# Patient Record
Sex: FEMALE | Race: WHITE | Employment: OTHER | URBAN - METROPOLITAN AREA
[De-identification: names, ages, dates, MRNs, and addresses within clinical notes are randomized per-mention and may not be internally consistent; named-entity substitution may affect disease eponyms.]

---

## 2017-01-27 ENCOUNTER — OFFICE VISIT (OUTPATIENT)
Dept: INTERNAL MEDICINE CLINIC | Age: 77
End: 2017-01-27

## 2017-01-27 VITALS
WEIGHT: 140 LBS | RESPIRATION RATE: 16 BRPM | SYSTOLIC BLOOD PRESSURE: 114 MMHG | HEART RATE: 62 BPM | DIASTOLIC BLOOD PRESSURE: 73 MMHG | BODY MASS INDEX: 25.61 KG/M2

## 2017-01-27 DIAGNOSIS — E78.2 MIXED HYPERLIPIDEMIA: ICD-10-CM

## 2017-01-27 DIAGNOSIS — R53.83 FATIGUE, UNSPECIFIED TYPE: ICD-10-CM

## 2017-01-27 DIAGNOSIS — L20.84 INTRINSIC ECZEMA: Primary | ICD-10-CM

## 2017-01-27 DIAGNOSIS — M54.32 LEFT SIDED SCIATICA: ICD-10-CM

## 2017-01-27 PROCEDURE — 99213 OFFICE O/P EST LOW 20 MIN: CPT | Performed by: INTERNAL MEDICINE

## 2017-01-27 RX ORDER — BETAMETHASONE DIPROPIONATE 0.5 MG/G
CREAM TOPICAL
Qty: 1 TUBE | Refills: 3 | Status: SHIPPED | OUTPATIENT
Start: 2017-01-27 | End: 2017-02-26

## 2017-02-07 RX ORDER — GABAPENTIN 300 MG/1
CAPSULE ORAL
Qty: 30 CAPSULE | Refills: 10 | Status: SHIPPED | OUTPATIENT
Start: 2017-02-07 | End: 2017-10-26 | Stop reason: ALTCHOICE

## 2017-06-07 RX ORDER — LEVOTHYROXINE SODIUM 88 UG/1
TABLET ORAL
Qty: 90 TABLET | Refills: 0 | Status: SHIPPED | OUTPATIENT
Start: 2017-06-07 | End: 2017-09-05 | Stop reason: SDUPTHER

## 2017-06-21 RX ORDER — CITALOPRAM 20 MG/1
TABLET ORAL
Qty: 90 TABLET | Refills: 1 | Status: SHIPPED | OUTPATIENT
Start: 2017-06-21 | End: 2017-12-18 | Stop reason: SDUPTHER

## 2017-08-18 ENCOUNTER — TELEPHONE (OUTPATIENT)
Dept: INTERNAL MEDICINE CLINIC | Age: 77
End: 2017-08-18

## 2017-09-05 RX ORDER — LEVOTHYROXINE SODIUM 88 UG/1
TABLET ORAL
Qty: 90 TABLET | Refills: 0 | Status: SHIPPED | OUTPATIENT
Start: 2017-09-05 | End: 2017-12-04 | Stop reason: SDUPTHER

## 2017-09-25 ENCOUNTER — TELEPHONE (OUTPATIENT)
Dept: INTERNAL MEDICINE CLINIC | Age: 77
End: 2017-09-25

## 2017-09-25 DIAGNOSIS — Z82.49 FAMILY HISTORY OF BRAIN ANEURYSM: ICD-10-CM

## 2017-09-25 DIAGNOSIS — Z82.49 FAMILY HISTORY OF ANEURYSM OF BLOOD VESSEL OF BRAIN: Primary | ICD-10-CM

## 2017-09-25 DIAGNOSIS — R51.9 ACUTE NONINTRACTABLE HEADACHE, UNSPECIFIED HEADACHE TYPE: Primary | ICD-10-CM

## 2017-09-25 DIAGNOSIS — R51.9 ACUTE NONINTRACTABLE HEADACHE, UNSPECIFIED HEADACHE TYPE: ICD-10-CM

## 2017-09-26 ENCOUNTER — HOSPITAL ENCOUNTER (OUTPATIENT)
Dept: CT IMAGING | Age: 77
Discharge: HOME | End: 2017-09-26
Attending: INTERNAL MEDICINE | Admitting: INTERNAL MEDICINE

## 2017-09-26 DIAGNOSIS — R51.9 SEVERE FRONTAL HEADACHES: ICD-10-CM

## 2017-09-26 RX ORDER — SODIUM CHLORIDE 0.9 % (FLUSH) 0.9 %
10 SYRINGE (ML) INJECTION
Status: COMPLETED | OUTPATIENT
Start: 2017-09-26 | End: 2017-09-26

## 2017-09-26 RX ADMIN — Medication 10 ML: at 09:05

## 2017-10-26 ENCOUNTER — OFFICE VISIT (OUTPATIENT)
Dept: INTERNAL MEDICINE CLINIC | Age: 77
End: 2017-10-26

## 2017-10-26 VITALS
WEIGHT: 140 LBS | HEART RATE: 72 BPM | SYSTOLIC BLOOD PRESSURE: 128 MMHG | DIASTOLIC BLOOD PRESSURE: 70 MMHG | BODY MASS INDEX: 25.61 KG/M2 | RESPIRATION RATE: 16 BRPM

## 2017-10-26 DIAGNOSIS — Z12.39 BREAST CANCER SCREENING: ICD-10-CM

## 2017-10-26 DIAGNOSIS — R51.9 HEADACHE AROUND THE EYES: Primary | ICD-10-CM

## 2017-10-26 DIAGNOSIS — E03.9 ACQUIRED HYPOTHYROIDISM: ICD-10-CM

## 2017-10-26 DIAGNOSIS — E78.2 MIXED HYPERLIPIDEMIA: ICD-10-CM

## 2017-10-26 PROCEDURE — 99213 OFFICE O/P EST LOW 20 MIN: CPT | Performed by: INTERNAL MEDICINE

## 2017-10-26 NOTE — PROGRESS NOTES
Subjective:      Pranav Kapadia is a 69 y.o. female who presents today for follow up on her chronic medical conditions as noted below. Patient Active Problem List:     Colon cancer screening     Breast cancer screening     Depression with anxiety     Eczema     Chronic radicular lumbar pain     9/23 had severe HA after working franctically in hot sun on hot day w/o sunglasses and not enough water    To ER ; treated and released  Had CTA brain ; normal; no aneurysm    No HA since    Is anticipating she will need left sooner than right knee surgery    Is to see Shaye Cooper    Mood excellent with celexa    Back pain not a current problems    Current Outpatient Prescriptions   Medication Sig Dispense Refill    levothyroxine (SYNTHROID) 88 MCG tablet TAKE 1 TABLET DAILY 90 tablet 0    citalopram (CELEXA) 20 MG tablet TAKE 1 TABLET DAILY 90 tablet 1    Multiple Vitamins-Iron (MULTIVITAMIN/IRON PO) Take 1 tablet by mouth daily. No current facility-administered medications for this visit. Past Medical History:   Diagnosis Date    Adenomatous colon polyp 11/2014    Chronic radicular lumbar pain 9/2013    Left; Gavi Bellis; Sujatha    Depression with anxiety 2004    Eczema     Hypothyroidism 7/2012    Insomnia     Osteoarthritis of thumb     base of thumbs bilateral        Social History   Substance Use Topics    Smoking status: Never Smoker    Smokeless tobacco: Never Used    Alcohol use No      Comment: No caffeine        ROS: The patient has had no headache, sore throat, fever or chills, cough, dyspnea, chest pain, nausea, vomiting or diarrhea, or edema. Objective:      /70   Pulse 72   Resp 16   Wt 140 lb (63.5 kg)   BMI 25.61 kg/m²    General: in no apparent distress   The patient's neck is free of nodes. Lungs are clear. Heart is normal in rate and regular in rhythm. Legs are free of edema. No rash or erythema.     ER note and imaging and Sept lab rev'd Assessment / Plan:      1. Headache around the eyes    2. Acquired hypothyroidism    3. Breast cancer screening    4.  Mixed hyperlipidemia            Plan   Get mammogram  RTC 4 mo, lab first  Orders Placed This Encounter   Procedures    ABBEY Screening Bilateral    CBC Auto Differential    Comprehensive Metabolic Panel    Lipid Panel    TSH without Reflex     Same meds

## 2017-11-20 ENCOUNTER — HOSPITAL ENCOUNTER (OUTPATIENT)
Dept: WOMENS IMAGING | Age: 77
Discharge: HOME | End: 2017-12-19
Attending: INTERNAL MEDICINE | Admitting: INTERNAL MEDICINE

## 2017-11-20 DIAGNOSIS — Z12.31 ENCOUNTER FOR SCREENING MAMMOGRAM FOR MALIGNANT NEOPLASM OF BREAST: ICD-10-CM

## 2017-11-28 ENCOUNTER — HOSPITAL ENCOUNTER (OUTPATIENT)
Dept: OTHER | Age: 77
Discharge: HOME | End: 2017-11-28
Attending: ORTHOPAEDIC SURGERY | Admitting: ORTHOPAEDIC SURGERY

## 2017-12-01 LAB
ALBUMIN SERPL-MCNC: 4.1 G/DL
ALP BLD-CCNC: 60 U/L
ALT SERPL-CCNC: 16 U/L
ANION GAP SERPL CALCULATED.3IONS-SCNC: NORMAL MMOL/L
AST SERPL-CCNC: 18 U/L
BASOPHILS ABSOLUTE: 0 /ΜL
BASOPHILS RELATIVE PERCENT: 1 %
BILIRUB SERPL-MCNC: 0.2 MG/DL (ref 0.1–1.4)
BUN BLDV-MCNC: 26 MG/DL
CALCIUM SERPL-MCNC: 9.4 MG/DL
CHLORIDE BLD-SCNC: 97 MMOL/L
CO2: 22 MMOL/L
CREAT SERPL-MCNC: 0.53 MG/DL
EOSINOPHILS ABSOLUTE: 0.3 /ΜL
EOSINOPHILS RELATIVE PERCENT: 4 %
GFR CALCULATED: 93
GLUCOSE BLD-MCNC: 82 MG/DL
HCT VFR BLD CALC: 38.2 % (ref 36–46)
HEMOGLOBIN: 12.4 G/DL (ref 12–16)
LYMPHOCYTES ABSOLUTE: 2.4 /ΜL
LYMPHOCYTES RELATIVE PERCENT: 37 %
MCH RBC QN AUTO: 31.2 PG
MCHC RBC AUTO-ENTMCNC: 32.5 G/DL
MCV RBC AUTO: 96 FL
MONOCYTES ABSOLUTE: 0.5 /ΜL
MONOCYTES RELATIVE PERCENT: 8 %
NEUTROPHILS ABSOLUTE: 3.3 /ΜL
NEUTROPHILS RELATIVE PERCENT: 50 %
PDW BLD-RTO: 13.1 %
PLATELET # BLD: 215 K/ΜL
PMV BLD AUTO: NORMAL FL
POTASSIUM SERPL-SCNC: 4.5 MMOL/L
RBC # BLD: 3.98 10^6/ΜL
SEDIMENTATION RATE, ERYTHROCYTE: 16
SODIUM BLD-SCNC: 137 MMOL/L
TOTAL PROTEIN: 6.8
WBC # BLD: 6.5 10^3/ML

## 2017-12-04 RX ORDER — LEVOTHYROXINE SODIUM 88 UG/1
TABLET ORAL
Qty: 90 TABLET | Refills: 1 | Status: SHIPPED | OUTPATIENT
Start: 2017-12-04 | End: 2018-06-02 | Stop reason: SDUPTHER

## 2017-12-15 ENCOUNTER — HOSPITAL ENCOUNTER (OUTPATIENT)
Dept: OTHER | Age: 77
Discharge: HOME | End: 2017-12-15
Attending: ORTHOPAEDIC SURGERY | Admitting: ORTHOPAEDIC SURGERY

## 2017-12-19 RX ORDER — CITALOPRAM 20 MG/1
TABLET ORAL
Qty: 90 TABLET | Refills: 1 | Status: SHIPPED | OUTPATIENT
Start: 2017-12-19 | End: 2018-05-22 | Stop reason: ALTCHOICE

## 2018-02-26 ENCOUNTER — OFFICE VISIT (OUTPATIENT)
Dept: INTERNAL MEDICINE CLINIC | Age: 78
End: 2018-02-26

## 2018-02-26 VITALS
WEIGHT: 155 LBS | DIASTOLIC BLOOD PRESSURE: 78 MMHG | SYSTOLIC BLOOD PRESSURE: 130 MMHG | BODY MASS INDEX: 28.35 KG/M2 | RESPIRATION RATE: 16 BRPM | HEART RATE: 60 BPM

## 2018-02-26 DIAGNOSIS — R60.0 LEG EDEMA, RIGHT: Primary | ICD-10-CM

## 2018-02-26 PROCEDURE — 99213 OFFICE O/P EST LOW 20 MIN: CPT | Performed by: INTERNAL MEDICINE

## 2018-02-26 RX ORDER — GABAPENTIN 300 MG/1
300 CAPSULE ORAL NIGHTLY
COMMUNITY
End: 2018-02-26 | Stop reason: SDUPTHER

## 2018-02-26 RX ORDER — TRIAMTERENE AND HYDROCHLOROTHIAZIDE 37.5; 25 MG/1; MG/1
TABLET ORAL
Qty: 30 TABLET | Refills: 3 | Status: ON HOLD | OUTPATIENT
Start: 2018-02-26 | End: 2018-03-18

## 2018-02-26 RX ORDER — GABAPENTIN 300 MG/1
300 CAPSULE ORAL NIGHTLY
Qty: 30 CAPSULE | Refills: 5 | Status: SHIPPED | OUTPATIENT
Start: 2018-02-26 | End: 2019-03-04 | Stop reason: SDUPTHER

## 2018-02-26 RX ORDER — GABAPENTIN 300 MG/1
300 CAPSULE ORAL NIGHTLY
Qty: 90 CAPSULE | Refills: 1 | Status: SHIPPED | OUTPATIENT
Start: 2018-02-26 | End: 2018-02-26 | Stop reason: SDUPTHER

## 2018-02-27 ENCOUNTER — HOSPITAL ENCOUNTER (OUTPATIENT)
Dept: OTHER | Age: 78
Discharge: HOME | End: 2018-02-27
Attending: ORTHOPAEDIC SURGERY | Admitting: ORTHOPAEDIC SURGERY

## 2018-02-27 ENCOUNTER — HOSPITAL ENCOUNTER (OUTPATIENT)
Dept: ULTRASOUND IMAGING | Age: 78
Discharge: HOME | End: 2018-02-27
Attending: INTERNAL MEDICINE | Admitting: INTERNAL MEDICINE

## 2018-02-27 DIAGNOSIS — R60.0 LOCALIZED EDEMA: ICD-10-CM

## 2018-02-27 DIAGNOSIS — R60.0 LEG EDEMA: ICD-10-CM

## 2018-03-06 ENCOUNTER — TELEPHONE (OUTPATIENT)
Dept: INTERNAL MEDICINE CLINIC | Age: 78
End: 2018-03-06

## 2018-03-08 ENCOUNTER — HOSPITAL ENCOUNTER (OUTPATIENT)
Dept: OTHER | Age: 78
Discharge: HOME | End: 2018-03-08

## 2018-03-08 LAB
ALBUMIN SERPL-MCNC: 3.4 GM/DL (ref 3.4–5)
ALP BLD-CCNC: 73 IU/L (ref 40–129)
ALT SERPL-CCNC: 14 U/L (ref 10–40)
ANION GAP SERPL CALCULATED.3IONS-SCNC: 9 MMOL/L (ref 4–16)
AST SERPL-CCNC: 20 IU/L (ref 15–37)
BASOPHILS ABSOLUTE: 0 K/CU MM
BASOPHILS RELATIVE PERCENT: 0.2 % (ref 0–1)
BILIRUB SERPL-MCNC: 0.6 MG/DL (ref 0–1)
BUN BLDV-MCNC: 9 MG/DL (ref 6–23)
CALCIUM SERPL-MCNC: 9 MG/DL (ref 8.3–10.6)
CHLORIDE BLD-SCNC: 102 MMOL/L (ref 99–110)
CO2: 27 MMOL/L (ref 21–32)
CREAT SERPL-MCNC: 0.6 MG/DL (ref 0.6–1.1)
DIFFERENTIAL TYPE: ABNORMAL
EOSINOPHILS ABSOLUTE: 0.4 K/CU MM
EOSINOPHILS RELATIVE PERCENT: 4.2 % (ref 0–3)
GFR AFRICAN AMERICAN: >60 ML/MIN/1.73M2
GFR NON-AFRICAN AMERICAN: >60 ML/MIN/1.73M2
GLUCOSE BLD-MCNC: 159 MG/DL (ref 70–99)
HCT VFR BLD CALC: 34.8 % (ref 37–47)
HEMOGLOBIN: 11.3 GM/DL (ref 12.5–16)
IMMATURE NEUTROPHIL %: 0.3 % (ref 0–0.43)
LYMPHOCYTES ABSOLUTE: 1 K/CU MM
LYMPHOCYTES RELATIVE PERCENT: 10.8 % (ref 24–44)
MAGNESIUM: 1.9 MG/DL (ref 1.8–2.4)
MCH RBC QN AUTO: 30.8 PG (ref 27–31)
MCHC RBC AUTO-ENTMCNC: 32.5 % (ref 32–36)
MCV RBC AUTO: 94.8 FL (ref 78–100)
MONOCYTES ABSOLUTE: 0.6 K/CU MM
MONOCYTES RELATIVE PERCENT: 7 % (ref 0–4)
NUCLEATED RBC %: 0 %
PDW BLD-RTO: 13.2 % (ref 11.7–14.9)
PHOSPHORUS: 1.7 MG/DL (ref 2.5–4.9)
PLATELET # BLD: 159 K/CU MM (ref 140–440)
PMV BLD AUTO: 10 FL (ref 7.5–11.1)
POTASSIUM SERPL-SCNC: 3.8 MMOL/L (ref 3.5–5.1)
RBC # BLD: 3.67 M/CU MM (ref 4.2–5.4)
SEGMENTED NEUTROPHILS ABSOLUTE COUNT: 6.9 K/CU MM
SEGMENTED NEUTROPHILS RELATIVE PERCENT: 77.5 % (ref 36–66)
SODIUM BLD-SCNC: 138 MMOL/L (ref 135–145)
TOTAL IMMATURE NEUTOROPHIL: 0.03 K/CU MM
TOTAL NUCLEATED RBC: 0 K/CU MM
TOTAL PROTEIN: 6.3 GM/DL (ref 6.4–8.2)
TSH HIGH SENSITIVITY: 2.44 UIU/ML (ref 0.27–4.2)
WBC # BLD: 8.9 K/CU MM (ref 4–10.5)

## 2018-03-18 PROBLEM — I48.91 ATRIAL FIBRILLATION, RAPID (HCC): Status: ACTIVE | Noted: 2018-03-18

## 2018-03-19 PROBLEM — M79.606 CHRONIC LEG PAIN: Status: ACTIVE | Noted: 2018-03-19

## 2018-03-19 PROBLEM — G89.29 CHRONIC LEG PAIN: Status: ACTIVE | Noted: 2018-03-19

## 2018-03-19 PROBLEM — Z96.652 HX OF TOTAL KNEE ARTHROPLASTY, LEFT: Status: ACTIVE | Noted: 2018-03-19

## 2018-03-19 PROBLEM — I48.0 PAROXYSMAL ATRIAL FIBRILLATION WITH RAPID VENTRICULAR RESPONSE (HCC): Status: ACTIVE | Noted: 2018-03-19

## 2018-03-20 ENCOUNTER — CARE COORDINATION (OUTPATIENT)
Dept: CASE MANAGEMENT | Age: 78
End: 2018-03-20

## 2018-03-20 DIAGNOSIS — I48.0 PAROXYSMAL ATRIAL FIBRILLATION WITH RAPID VENTRICULAR RESPONSE (HCC): Primary | ICD-10-CM

## 2018-03-20 NOTE — CARE COORDINATION
[facility] Transitions Initial Follow Up Call    Call within 2 business days of discharge: Yes    Patient: Elisha Fernando Patient : 1940   MRN: [id number]  Reason for Admission: There are no discharge diagnoses documented for the most recent discharge. Discharge Date: 3/19/18 RARS: [facility] Risk Score: 16     Spoke with:  [address]: [facility]    Non-face-to-face services provided:  Assessment and support for treatment adherence and medication management-[facility]    Care Transitions 24 Hour Call    Care Transitions Interventions         Follow Up:  Spoke with patient. Patient reports that she was able to get her prescriptions filled and denies worsening symptoms. Reviewed discharge instructions and patient medications. Medication reconciliation completed. Patient reports that she is independent at home and has no issue with transportation or with covering the cost of her medications. Patient has a follow up appointment with her PCP. Denies any questions, equipment or resource needs at this time. Agreeable to Care Transition. CTC contact information given. Encouraged patient to call if needs arise.     Future Appointments  Date Time Provider Betito Mercedes   3/23/2018 11:15 AM Carla Hills MD [address] [facility]   3/29/2018 1:00 PM Tayo Melvin  [facility]       Harleen Maher RN

## 2018-03-21 ENCOUNTER — TELEPHONE (OUTPATIENT)
Dept: CARDIOLOGY CLINIC | Age: 78
End: 2018-03-21

## 2018-03-21 NOTE — TELEPHONE ENCOUNTER
Pt called and states she was in ED last night, for A-Fib and was told to call office to let Dr Bill Cordero know. Pt wants to know if she needs to do anything. She has an ov with Dr Bill Cordero on 3/29/18. Please call pt to advise.

## 2018-03-22 ENCOUNTER — CARE COORDINATION (OUTPATIENT)
Dept: CASE MANAGEMENT | Age: 78
End: 2018-03-22

## 2018-03-23 ENCOUNTER — OFFICE VISIT (OUTPATIENT)
Dept: INTERNAL MEDICINE CLINIC | Age: 78
End: 2018-03-23

## 2018-03-23 VITALS
SYSTOLIC BLOOD PRESSURE: 112 MMHG | BODY MASS INDEX: 28.35 KG/M2 | DIASTOLIC BLOOD PRESSURE: 70 MMHG | RESPIRATION RATE: 16 BRPM | WEIGHT: 155 LBS | HEART RATE: 72 BPM

## 2018-03-23 DIAGNOSIS — Z09 HOSPITAL DISCHARGE FOLLOW-UP: Primary | ICD-10-CM

## 2018-03-23 DIAGNOSIS — I48.0 PAF (PAROXYSMAL ATRIAL FIBRILLATION) (HCC): ICD-10-CM

## 2018-03-23 DIAGNOSIS — Z96.652 STATUS POST TOTAL LEFT KNEE REPLACEMENT: ICD-10-CM

## 2018-03-23 PROBLEM — I48.19 PERSISTENT ATRIAL FIBRILLATION (HCC): Status: ACTIVE | Noted: 2018-03-23

## 2018-03-23 PROBLEM — Z79.01 CHRONIC ANTICOAGULATION: Status: ACTIVE | Noted: 2018-03-01

## 2018-03-23 PROBLEM — I48.91 ATRIAL FIBRILLATION WITH RVR (HCC): Status: ACTIVE | Noted: 2018-03-18

## 2018-03-23 PROCEDURE — 93000 ELECTROCARDIOGRAM COMPLETE: CPT | Performed by: INTERNAL MEDICINE

## 2018-03-23 PROCEDURE — 99496 TRANSJ CARE MGMT HIGH F2F 7D: CPT | Performed by: INTERNAL MEDICINE

## 2018-03-23 PROCEDURE — 1111F DSCHRG MED/CURRENT MED MERGE: CPT | Performed by: INTERNAL MEDICINE

## 2018-03-23 NOTE — PROGRESS NOTES
Subjective:      Alia Pappas is a 69 y.o. female who presents today for follow up on her chronic medical conditions as noted below. Patient Active Problem List:     Colon cancer screening     Breast cancer screening     Depression with anxiety     Eczema     Chronic radicular lumbar pain     Persistent atrial fibrillation (HCC)     Chronic anticoagulation     She had TKR , then A fib with RVR    Has been hospitalized for A fib  Taking cardizem and xarelto   Following with Yang    Has ongoing pain left knee , more than usual   Has been in regulary continuous PT for rehab and Is progressing well    Doing home exercises, but still has pain; taking neurontin on ly one daily  She is taking norco one /d    Current Outpatient Prescriptions   Medication Sig Dispense Refill    rivaroxaban (XARELTO) 20 MG TABS tablet Take 1 tablet by mouth daily 30 tablet 3    diltiazem (CARDIZEM CD) 240 MG extended release capsule Take 1 capsule by mouth daily 30 capsule 3    gabapentin (NEURONTIN) 300 MG capsule Take 1 capsule by mouth nightly. 30 capsule 5    citalopram (CELEXA) 20 MG tablet TAKE 1 TABLET DAILY 90 tablet 1    levothyroxine (SYNTHROID) 88 MCG tablet TAKE 1 TABLET DAILY 90 tablet 1    Multiple Vitamins-Iron (MULTIVITAMIN/IRON PO) Take 1 tablet by mouth daily. No current facility-administered medications for this visit. Past Medical History:   Diagnosis Date    Adenomatous colon polyp 11/2014    Atrial fibrillation with RVR (HCC) 03/18/2018    Chronic anticoagulation 03/2018    A Fib; Xarelto    Chronic radicular lumbar pain 9/2013    Left; Dedra Dickey;  Sujatha    Depression with anxiety 2004    Eczema     Hypothyroidism 7/2012    Insomnia     Osteoarthritis of thumb     base of thumbs bilateral    S/P knee replacement     bilateral        Social History   Substance Use Topics    Smoking status: Never Smoker    Smokeless tobacco: Never Used    Alcohol use No      Comment: No encounter. Assessment/Plan:  Charlcie Meckel was seen today for follow-up from hospital.    Diagnoses and all orders for this visit:    Hospital discharge follow-up  -     CBC Auto Differential; Future  -     Basic Metabolic Panel; Future    PAF (paroxysmal atrial fibrillation) (HCC)  -     EKG 12 Lead  -     CBC Auto Differential; Future  -     Basic Metabolic Panel; Future    Status post total left knee replacement  -     CBC Auto Differential; Future  -     Basic Metabolic Panel; Future          Diagnostic test results reviewed: inpatient labs, EKG, CT angiogram-lungs, nuclear study-heart and H and P and discharge summary and [facility] records and ER visit and Yang consult    Patient risk of morbidity and mortality: high    Medical Decision Making: high complexity     EKG today : Sinus  Rhythm  -First degree A-V block   Mariana = 220  BORDERLINE RHYTHM    She is now in NSR  Patient denies any exertional chest pain, dyspnea, palpitations, syncope, orthopnea, edema or paroxysmal nocturnal dyspnea. The patient denies any symptoms of neurological impairment or TIA's; no amaurosis, diplopia, dysphasia, or unilateral disturbance of motor or sensory function. No loss of balance or vertigo.     She will continue rehab and home exefcise program  She will f/u Marylin and ortho  Continue anticoag and reg meds  May try to increase neurontin to tid and see if it will help knee pain  May try advil 400 mg with tylenol 1000 mg once daily prn pain  Discussed risk with xarelto    RTC 2 mo, sooner if necessary  Lab prior to RTC    All meds were reconciled    Orders Placed This Encounter   Procedures    CBC Auto Differential    Basic Metabolic Panel    EKG 12 Lead

## 2018-04-18 ENCOUNTER — OFFICE VISIT (OUTPATIENT)
Dept: CARDIOLOGY CLINIC | Age: 78
End: 2018-04-18

## 2018-04-18 ENCOUNTER — NURSE ONLY (OUTPATIENT)
Dept: CARDIOLOGY CLINIC | Age: 78
End: 2018-04-18

## 2018-04-18 VITALS
WEIGHT: 159 LBS | HEIGHT: 63 IN | BODY MASS INDEX: 28.17 KG/M2 | SYSTOLIC BLOOD PRESSURE: 120 MMHG | DIASTOLIC BLOOD PRESSURE: 64 MMHG | HEART RATE: 60 BPM

## 2018-04-18 DIAGNOSIS — I48.0 PAROXYSMAL ATRIAL FIBRILLATION (HCC): Primary | ICD-10-CM

## 2018-04-18 DIAGNOSIS — I48.0 PAROXYSMAL ATRIAL FIBRILLATION (HCC): ICD-10-CM

## 2018-04-18 PROCEDURE — 99213 OFFICE O/P EST LOW 20 MIN: CPT | Performed by: INTERNAL MEDICINE

## 2018-04-20 DIAGNOSIS — I48.0 PAROXYSMAL ATRIAL FIBRILLATION (HCC): ICD-10-CM

## 2018-05-03 ENCOUNTER — TELEPHONE (OUTPATIENT)
Dept: CARDIOLOGY CLINIC | Age: 78
End: 2018-05-03

## 2018-05-03 DIAGNOSIS — I48.19 PERSISTENT ATRIAL FIBRILLATION (HCC): Primary | ICD-10-CM

## 2018-05-16 ENCOUNTER — HOSPITAL ENCOUNTER (OUTPATIENT)
Dept: GENERAL RADIOLOGY | Age: 78
Discharge: HOME | End: 2018-05-16
Attending: INTERNAL MEDICINE | Admitting: INTERNAL MEDICINE

## 2018-05-16 LAB
ANION GAP SERPL CALCULATED.3IONS-SCNC: 11 MMOL/L (ref 4–16)
BASOPHILS ABSOLUTE: 0 K/CU MM
BASOPHILS RELATIVE PERCENT: 0.7 % (ref 0–1)
BUN BLDV-MCNC: 21 MG/DL (ref 6–23)
CALCIUM SERPL-MCNC: 9.3 MG/DL (ref 8.3–10.6)
CHLORIDE BLD-SCNC: 104 MMOL/L (ref 99–110)
CO2: 26 MMOL/L (ref 21–32)
CREAT SERPL-MCNC: 0.5 MG/DL (ref 0.6–1.1)
DIFFERENTIAL TYPE: ABNORMAL
EOSINOPHILS ABSOLUTE: 0.2 K/CU MM
EOSINOPHILS RELATIVE PERCENT: 4.8 % (ref 0–3)
GFR AFRICAN AMERICAN: >60 ML/MIN/1.73M2
GFR NON-AFRICAN AMERICAN: >60 ML/MIN/1.73M2
GLUCOSE BLD-MCNC: 90 MG/DL (ref 70–99)
HCT VFR BLD CALC: 36.2 % (ref 37–47)
HEMOGLOBIN: 11.3 GM/DL (ref 12.5–16)
IMMATURE NEUTROPHIL %: 0.2 % (ref 0–0.43)
LYMPHOCYTES ABSOLUTE: 1.5 K/CU MM
LYMPHOCYTES RELATIVE PERCENT: 36.6 % (ref 24–44)
MCH RBC QN AUTO: 30.4 PG (ref 27–31)
MCHC RBC AUTO-ENTMCNC: 31.2 % (ref 32–36)
MCV RBC AUTO: 97.3 FL (ref 78–100)
MONOCYTES ABSOLUTE: 0.4 K/CU MM
MONOCYTES RELATIVE PERCENT: 10.3 % (ref 0–4)
NUCLEATED RBC %: 0 %
PDW BLD-RTO: 13.7 % (ref 11.7–14.9)
PLATELET # BLD: 195 K/CU MM (ref 140–440)
PMV BLD AUTO: 9.5 FL (ref 7.5–11.1)
POTASSIUM SERPL-SCNC: 4.6 MMOL/L (ref 3.5–5.1)
RBC # BLD: 3.72 M/CU MM (ref 4.2–5.4)
SEGMENTED NEUTROPHILS ABSOLUTE COUNT: 2 K/CU MM
SEGMENTED NEUTROPHILS RELATIVE PERCENT: 47.4 % (ref 36–66)
SODIUM BLD-SCNC: 141 MMOL/L (ref 135–145)
TOTAL IMMATURE NEUTOROPHIL: 0.01 K/CU MM
TOTAL NUCLEATED RBC: 0 K/CU MM
WBC # BLD: 4.2 K/CU MM (ref 4–10.5)

## 2018-05-18 ENCOUNTER — OFFICE VISIT (OUTPATIENT)
Dept: CARDIOLOGY CLINIC | Age: 78
End: 2018-05-18

## 2018-05-18 VITALS
OXYGEN SATURATION: 99 % | HEIGHT: 63 IN | RESPIRATION RATE: 15 BRPM | HEART RATE: 66 BPM | SYSTOLIC BLOOD PRESSURE: 122 MMHG | WEIGHT: 158 LBS | TEMPERATURE: 98 F | DIASTOLIC BLOOD PRESSURE: 64 MMHG | BODY MASS INDEX: 28 KG/M2

## 2018-05-18 DIAGNOSIS — I48.91 ATRIAL FIBRILLATION AND FLUTTER (HCC): Primary | ICD-10-CM

## 2018-05-18 DIAGNOSIS — I48.0 PAROXYSMAL ATRIAL FIBRILLATION (HCC): ICD-10-CM

## 2018-05-18 DIAGNOSIS — I48.92 ATRIAL FIBRILLATION AND FLUTTER (HCC): Primary | ICD-10-CM

## 2018-05-18 PROCEDURE — 99204 OFFICE O/P NEW MOD 45 MIN: CPT | Performed by: INTERNAL MEDICINE

## 2018-05-18 PROCEDURE — 99354 PR PROLONGED SVC OUTPATIENT SETTING 1ST HOUR: CPT | Performed by: INTERNAL MEDICINE

## 2018-05-18 PROCEDURE — 93000 ELECTROCARDIOGRAM COMPLETE: CPT | Performed by: INTERNAL MEDICINE

## 2018-05-22 ENCOUNTER — OFFICE VISIT (OUTPATIENT)
Dept: INTERNAL MEDICINE CLINIC | Age: 78
End: 2018-05-22

## 2018-05-22 VITALS
BODY MASS INDEX: 27.46 KG/M2 | RESPIRATION RATE: 16 BRPM | DIASTOLIC BLOOD PRESSURE: 78 MMHG | HEART RATE: 64 BPM | SYSTOLIC BLOOD PRESSURE: 120 MMHG | WEIGHT: 155 LBS

## 2018-05-22 DIAGNOSIS — I48.0 PAF (PAROXYSMAL ATRIAL FIBRILLATION) (HCC): Primary | ICD-10-CM

## 2018-05-22 DIAGNOSIS — Z79.01 CHRONIC ANTICOAGULATION: ICD-10-CM

## 2018-05-22 DIAGNOSIS — Z79.899 MEDICATION MANAGEMENT: ICD-10-CM

## 2018-05-22 DIAGNOSIS — G47.9 SLEEP DISORDER: ICD-10-CM

## 2018-05-22 DIAGNOSIS — R60.0 LEG EDEMA: ICD-10-CM

## 2018-05-22 DIAGNOSIS — F41.9 ANXIETY: ICD-10-CM

## 2018-05-22 PROCEDURE — 99214 OFFICE O/P EST MOD 30 MIN: CPT | Performed by: INTERNAL MEDICINE

## 2018-05-22 RX ORDER — ZOLPIDEM TARTRATE 5 MG/1
5 TABLET ORAL NIGHTLY PRN
Qty: 30 TABLET | Refills: 2 | Status: SHIPPED | OUTPATIENT
Start: 2018-05-22 | End: 2018-10-08 | Stop reason: SDUPTHER

## 2018-05-22 ASSESSMENT — PATIENT HEALTH QUESTIONNAIRE - PHQ9
SUM OF ALL RESPONSES TO PHQ9 QUESTIONS 1 & 2: 0
1. LITTLE INTEREST OR PLEASURE IN DOING THINGS: 0
SUM OF ALL RESPONSES TO PHQ QUESTIONS 1-9: 0
2. FEELING DOWN, DEPRESSED OR HOPELESS: 0

## 2018-05-24 PROCEDURE — 93228 REMOTE 30 DAY ECG REV/REPORT: CPT | Performed by: INTERNAL MEDICINE

## 2018-06-03 ASSESSMENT — ENCOUNTER SYMPTOMS
COLOR CHANGE: 0
BACK PAIN: 1
DIARRHEA: 0
COUGH: 0
VOMITING: 0
WHEEZING: 0
BLOOD IN STOOL: 0
SHORTNESS OF BREATH: 1
CHEST TIGHTNESS: 0
ABDOMINAL PAIN: 0
EYE PAIN: 0
PHOTOPHOBIA: 0
CONSTIPATION: 0
NAUSEA: 0

## 2018-06-04 RX ORDER — LEVOTHYROXINE SODIUM 88 UG/1
TABLET ORAL
Qty: 90 TABLET | Refills: 1 | Status: SHIPPED | OUTPATIENT
Start: 2018-06-04 | End: 2018-12-01 | Stop reason: SDUPTHER

## 2018-06-05 ENCOUNTER — TELEPHONE (OUTPATIENT)
Dept: CARDIOLOGY CLINIC | Age: 78
End: 2018-06-05

## 2018-06-06 ENCOUNTER — HOSPITAL ENCOUNTER (OUTPATIENT)
Dept: CARDIOLOGY | Age: 78
Discharge: HOME | End: 2018-06-06
Attending: INTERNAL MEDICINE | Admitting: INTERNAL MEDICINE

## 2018-06-06 DIAGNOSIS — I48.0 PAF (PAROXYSMAL ATRIAL FIBRILLATION) (HCC): ICD-10-CM

## 2018-06-06 DIAGNOSIS — I48.0 PAROXYSMAL ATRIAL FIBRILLATION (HCC): ICD-10-CM

## 2018-06-18 ENCOUNTER — TELEPHONE (OUTPATIENT)
Dept: CARDIOLOGY CLINIC | Age: 78
End: 2018-06-18

## 2018-06-18 RX ORDER — CITALOPRAM 20 MG/1
TABLET ORAL
Qty: 90 TABLET | Refills: 1 | Status: SHIPPED | OUTPATIENT
Start: 2018-06-18 | End: 2018-07-20 | Stop reason: ALTCHOICE

## 2018-07-17 RX ORDER — DILTIAZEM HYDROCHLORIDE 240 MG/1
240 CAPSULE, COATED, EXTENDED RELEASE ORAL DAILY
Qty: 90 CAPSULE | Refills: 3 | Status: SHIPPED | OUTPATIENT
Start: 2018-07-17 | End: 2018-08-03 | Stop reason: SDUPTHER

## 2018-07-20 ENCOUNTER — OFFICE VISIT (OUTPATIENT)
Dept: CARDIOLOGY CLINIC | Age: 78
End: 2018-07-20

## 2018-07-20 VITALS
WEIGHT: 161.2 LBS | HEART RATE: 66 BPM | HEIGHT: 63 IN | SYSTOLIC BLOOD PRESSURE: 110 MMHG | DIASTOLIC BLOOD PRESSURE: 74 MMHG | TEMPERATURE: 98.6 F | BODY MASS INDEX: 28.56 KG/M2 | OXYGEN SATURATION: 96 %

## 2018-07-20 DIAGNOSIS — I48.0 PAF (PAROXYSMAL ATRIAL FIBRILLATION) (HCC): Primary | ICD-10-CM

## 2018-07-20 PROCEDURE — 99213 OFFICE O/P EST LOW 20 MIN: CPT | Performed by: INTERNAL MEDICINE

## 2018-07-20 RX ORDER — PROPAFENONE HYDROCHLORIDE 150 MG/1
150 TABLET, FILM COATED ORAL EVERY 8 HOURS
Qty: 90 TABLET | Refills: 3 | Status: SHIPPED | OUTPATIENT
Start: 2018-07-20 | End: 2018-09-26 | Stop reason: SDUPTHER

## 2018-07-20 NOTE — PROGRESS NOTES
Electrophysiology FU Note      Reason for consultation: ATRIAL FIBRILLATION    Chief complaint : Shortness of breath, palpitations    Referring physician:  Kacey Vazquez      Primary care physician: Holli Good MD      History of Present Illness: This visit (2018)      Chief Complaint   Patient presents with    Atrial Fibrillation     Pt has HX of afib. Pt states she can feel palpitations, they seem to be worse at night. She also has feet/ankle edema. Pt denies CP, SOB, or dizziness. Pt is now off of celexa and wants to disscuss anit rhythmic medications. Previous visit:    Chief Complaint   Patient presents with   Lay Frey referred patient for a-fib. she states just sent 30 day event monitor back this morning. She has had a few episodes of SOB with palpitations. Makes her feel tired and weak. Having been going on for some time now. Patient is on xarelto for atrial fibrillation. Also taking cardizem. Patient denies any chest pain, dizziness, or edema. also no problems with bleeding, anesthesia and denies having sleep apena. Past medical history:   Past Medical History:   Diagnosis Date    30 day event monitor 2018    Afib    Adenomatous colon polyp 2014    Atrial fibrillation with RVR (HCC) 2018    Chronic anticoagulation 2018    A Fib; Xarelto    Chronic radicular lumbar pain 2013    Left; [employer];  [place]    Depression with anxiety 2004    Eczema     History of stress test 2018    Normal florina, EF 60%    Hypothyroidism 2012    Insomnia     Osteoarthritis of thumb     base of thumbs bilateral    S/P knee replacement     bilateral       Surgical history :   Past Surgical History:   Procedure Laterality Date    BREAST BIOPSY Bilateral      SECTION      X 3    TOTAL KNEE ARTHROPLASTY Right 2017    Camille Gomez    TOTAL KNEE ARTHROPLASTY Left (1.6 m)   Wt 161 lb 3.2 oz (73.1 kg)   SpO2 96%   BMI 28.56 kg/m²    Wt Readings from Last 3 Encounters:   08/01/18 164 lb (74.4 kg)   07/24/18 155 lb (70.3 kg)   07/20/18 161 lb 3.2 oz (73.1 kg)     Body mass index is 28.56 kg/m². Physical Exam   Constitutional: She is oriented to person, place, and time and well-developed, well-nourished, and in no distress. HENT:   Head: Normocephalic and atraumatic. Eyes: Conjunctivae and EOM are normal. Pupils are equal, round, and reactive to light. Right eye exhibits no discharge. Neck: Normal range of motion. No JVD present. No thyromegaly present. Cardiovascular: Normal rate, regular rhythm and normal heart sounds. Exam reveals no friction rub. No murmur heard. Pulmonary/Chest: Effort normal and breath sounds normal. No stridor. No respiratory distress. She has no wheezes. Abdominal: Soft. Bowel sounds are normal. She exhibits no distension. There is no tenderness. Musculoskeletal: Normal range of motion. She exhibits no edema or tenderness. Neurological: She is alert and oriented to person, place, and time. No cranial nerve deficit. Skin: Skin is warm and dry. No rash noted. No erythema.    Psychiatric: Mood and affect normal.         CBC:   Lab Results   Component Value Date    WBC 4.2 05/16/2018    HGB 11.3 05/16/2018    HCT 36.2 05/16/2018     05/16/2018     Lipids:   Lab Results   Component Value Date    CHOL 178 07/19/2016    TRIG 60 07/19/2016    HDL 75 (H) 07/19/2016    LDLCALC 91 07/19/2016    LDLDIRECT 100 (H) 07/06/2012     PT/INR: No results found for: INR     BMP:    Lab Results   Component Value Date     05/16/2018    K 4.6 05/16/2018     05/16/2018    CO2 26 05/16/2018    BUN 21 05/16/2018     CMP:   Lab Results   Component Value Date    AST 16 03/18/2018    PROT 6.6 03/18/2018    BILITOT 0.3 03/18/2018    ALKPHOS 86 03/18/2018     TSH:    Lab Results   Component Value Date    TSH 1.12 07/19/2016

## 2018-07-24 ENCOUNTER — OFFICE VISIT (OUTPATIENT)
Dept: INTERNAL MEDICINE CLINIC | Age: 78
End: 2018-07-24

## 2018-07-24 VITALS
RESPIRATION RATE: 16 BRPM | OXYGEN SATURATION: 97 % | SYSTOLIC BLOOD PRESSURE: 118 MMHG | HEART RATE: 68 BPM | WEIGHT: 155 LBS | DIASTOLIC BLOOD PRESSURE: 70 MMHG | BODY MASS INDEX: 27.46 KG/M2

## 2018-07-24 DIAGNOSIS — I48.0 PAF (PAROXYSMAL ATRIAL FIBRILLATION) (HCC): Primary | ICD-10-CM

## 2018-07-24 DIAGNOSIS — E03.9 HYPOTHYROIDISM, UNSPECIFIED TYPE: ICD-10-CM

## 2018-07-24 DIAGNOSIS — Z12.39 BREAST CANCER SCREENING: ICD-10-CM

## 2018-07-24 PROCEDURE — 99213 OFFICE O/P EST LOW 20 MIN: CPT | Performed by: INTERNAL MEDICINE

## 2018-07-31 ENCOUNTER — TELEPHONE (OUTPATIENT)
Dept: CARDIOLOGY CLINIC | Age: 78
End: 2018-07-31

## 2018-07-31 NOTE — TELEPHONE ENCOUNTER
Patient stated she was put on Rythmol 150mg every 8 hours 7/20/18 she is experiencing shortness of breath , swelling in feet and ankles which started over the weekend. Please advise.

## 2018-08-01 ENCOUNTER — OFFICE VISIT (OUTPATIENT)
Dept: CARDIOLOGY CLINIC | Age: 78
End: 2018-08-01

## 2018-08-01 VITALS
WEIGHT: 164 LBS | BODY MASS INDEX: 29.06 KG/M2 | HEART RATE: 101 BPM | DIASTOLIC BLOOD PRESSURE: 72 MMHG | SYSTOLIC BLOOD PRESSURE: 120 MMHG | HEIGHT: 63 IN

## 2018-08-01 DIAGNOSIS — I48.19 PERSISTENT ATRIAL FIBRILLATION (HCC): Primary | ICD-10-CM

## 2018-08-01 PROCEDURE — 99213 OFFICE O/P EST LOW 20 MIN: CPT | Performed by: NURSE PRACTITIONER

## 2018-08-01 PROCEDURE — 93000 ELECTROCARDIOGRAM COMPLETE: CPT | Performed by: NURSE PRACTITIONER

## 2018-08-01 NOTE — TELEPHONE ENCOUNTER
Routed call back to [facility], [address] to call patient and bring in to see Cony LOYA today per Vera Lim. She needs EKG & elevated.

## 2018-08-01 NOTE — LETTER
? Use of pathology  ? I authorize [address] to dispose of tissues, specimens or organs when pathology is complete. ? Use of radiology  ? A contrast agent may be required for radiology procedures. My practitioner has advised me of the risks of using, risks of not using, benefits, side effects, and alternatives. ? Observers or use of photography, video/audio recording, or televising of the procedure(s). This is for medical, scientific, or educational purposes. This includes appropriate portions of my body. My identity will not be revealed. ? I consent to release of my social security number and other identifying information to [employer] (FDA), and the supplier/, if I receive tissue, a device, or implant. This is to track the tissue, device, or implant for defect, recall, infection, etc.     ? Use of blood and/or blood products, if needed, through my hospital stay. My practitioner has advised me of the risks of using, risks of not using, benefits, side effects, and alternatives. ___ I do NOT want Blood or Blood products given. (Complete separate  refusal form)    Code Status (dimitri one):  ___ I do NOT HAVE a DNR order. I am a Full-code.   I will receive CPR, intubation,  chest compressions, medications, and/or other life saving measures if I have a  cardiac or respiratory arrest.    ___ I have a Do Not Resuscitate (DNR)order.   (dimitri one below)  ___  I rescind my DNR for surgery and immediate post-operative period through Phase 2 recovery. This means, for that time period, I will be a Full-code and receive CPR, intubation, chest compressions, medications, and/or other life saving measures, if I have a cardiac or respiratory arrest.    ___ I WANT to keep my DNR in effect during my procedure(s) and immediate post-operative recovery period through Phase 2 recovery.   (Complete separate refusal form)

## 2018-08-03 ENCOUNTER — TELEPHONE (OUTPATIENT)
Dept: CARDIOLOGY CLINIC | Age: 78
End: 2018-08-03

## 2018-08-03 RX ORDER — DILTIAZEM HYDROCHLORIDE 360 MG/1
360 CAPSULE, EXTENDED RELEASE ORAL DAILY
Qty: 30 CAPSULE | Refills: 2 | Status: ON HOLD | OUTPATIENT
Start: 2018-08-03 | End: 2018-08-21 | Stop reason: HOSPADM

## 2018-08-03 NOTE — TELEPHONE ENCOUNTER
I spoke with the patient per Dr. Clem Davis increase Cardizem to 360mg Daily and she is to continue the Rythmol until she has the ablation on 08/20/18. Cardizem sent to [facility]. Patient verbalized understanding.

## 2018-08-13 ENCOUNTER — TELEPHONE (OUTPATIENT)
Dept: CARDIOLOGY CLINIC | Age: 78
End: 2018-08-13

## 2018-08-17 ENCOUNTER — HOSPITAL ENCOUNTER (OUTPATIENT)
Dept: GENERAL RADIOLOGY | Age: 78
Discharge: HOME | End: 2018-08-17
Attending: INTERNAL MEDICINE | Admitting: INTERNAL MEDICINE

## 2018-08-17 DIAGNOSIS — Z01.818 PRE-PROCEDURAL EXAMINATION: ICD-10-CM

## 2018-08-17 LAB
ANION GAP SERPL CALCULATED.3IONS-SCNC: 12 MMOL/L (ref 4–16)
APTT: 31.8 SECONDS (ref 21.2–33)
BASOPHILS ABSOLUTE: 0 K/CU MM
BASOPHILS RELATIVE PERCENT: 0.8 % (ref 0–1)
BUN BLDV-MCNC: 22 MG/DL (ref 6–23)
CALCIUM SERPL-MCNC: 9.1 MG/DL (ref 8.3–10.6)
CHLORIDE BLD-SCNC: 103 MMOL/L (ref 99–110)
CO2: 25 MMOL/L (ref 21–32)
CREAT SERPL-MCNC: 0.7 MG/DL (ref 0.6–1.1)
DIFFERENTIAL TYPE: ABNORMAL
EOSINOPHILS ABSOLUTE: 0.2 K/CU MM
EOSINOPHILS RELATIVE PERCENT: 4.1 % (ref 0–3)
GFR AFRICAN AMERICAN: >60 ML/MIN/1.73M2
GFR NON-AFRICAN AMERICAN: >60 ML/MIN/1.73M2
GLUCOSE BLD-MCNC: 90 MG/DL (ref 70–99)
HCT VFR BLD CALC: 38.3 % (ref 37–47)
HEMOGLOBIN: 12.2 GM/DL (ref 12.5–16)
IMMATURE NEUTROPHIL %: 0.4 % (ref 0–0.43)
INR BLD: 1.23 INDEX
LYMPHOCYTES ABSOLUTE: 1.4 K/CU MM
LYMPHOCYTES RELATIVE PERCENT: 25.6 % (ref 24–44)
MAGNESIUM: 2.2 MG/DL (ref 1.8–2.4)
MCH RBC QN AUTO: 30.8 PG (ref 27–31)
MCHC RBC AUTO-ENTMCNC: 31.9 % (ref 32–36)
MCV RBC AUTO: 96.7 FL (ref 78–100)
MONOCYTES ABSOLUTE: 0.6 K/CU MM
MONOCYTES RELATIVE PERCENT: 10.7 % (ref 0–4)
NUCLEATED RBC %: 0 %
PDW BLD-RTO: 13.6 % (ref 11.7–14.9)
PHOSPHORUS: 3.4 MG/DL (ref 2.5–4.9)
PLATELET # BLD: 222 K/CU MM (ref 140–440)
PMV BLD AUTO: 10 FL (ref 7.5–11.1)
POTASSIUM SERPL-SCNC: 4.7 MMOL/L (ref 3.5–5.1)
PROTHROMBIN TIME: 14.2 SECONDS
RBC # BLD: 3.96 M/CU MM (ref 4.2–5.4)
SEGMENTED NEUTROPHILS ABSOLUTE COUNT: 3.1 K/CU MM
SEGMENTED NEUTROPHILS RELATIVE PERCENT: 58.4 % (ref 36–66)
SODIUM BLD-SCNC: 140 MMOL/L (ref 135–145)
TOTAL IMMATURE NEUTOROPHIL: 0.02 K/CU MM
TOTAL NUCLEATED RBC: 0 K/CU MM
WBC # BLD: 5.3 K/CU MM (ref 4–10.5)

## 2018-08-20 PROBLEM — Z86.79 S/P ABLATION OF ATRIAL FIBRILLATION: Status: ACTIVE | Noted: 2018-08-20

## 2018-08-20 PROBLEM — Z98.890 S/P ABLATION OF ATRIAL FIBRILLATION: Status: ACTIVE | Noted: 2018-08-20

## 2018-08-21 ENCOUNTER — CARE COORDINATION (OUTPATIENT)
Dept: CASE MANAGEMENT | Age: 78
End: 2018-08-21

## 2018-08-22 ENCOUNTER — CARE COORDINATION (OUTPATIENT)
Dept: CASE MANAGEMENT | Age: 78
End: 2018-08-22

## 2018-08-22 DIAGNOSIS — Z98.890 S/P ABLATION OF ATRIAL FLUTTER: Primary | ICD-10-CM

## 2018-08-22 DIAGNOSIS — Z86.79 S/P ABLATION OF ATRIAL FLUTTER: Primary | ICD-10-CM

## 2018-08-22 PROCEDURE — 1111F DSCHRG MED/CURRENT MED MERGE: CPT | Performed by: INTERNAL MEDICINE

## 2018-08-22 NOTE — CARE COORDINATION
[facility] Transitions Initial Follow Up Call    Call within 2 business days of discharge: Yes    Patient: Jo-Ann Yanes Patient : 1940   MRN: [id number]  Reason for Admission: There are no discharge diagnoses documented for the most recent discharge. Discharge Date: 18 RARS: Readmission Risk Score: 10     Spoke with:  patient    Facility:  [facility]    Non-face-to-face services provided:  Assessment and support for treatment adherence and medication management-[id number]    Care Transitions 24 Hour Call    Schedule Follow Up Appointment with PCP:  Completed  Do you have a copy of your discharge instructions?:  Yes  Do you have all of your prescriptions and are they filled?:  Yes  Have you been contacted by a [email or web address] Pharmacist?:  No  Have you scheduled your follow up appointment?:  Yes  How are you going to get to your appointment?:  Car - family or friend to transport  Were you discharged with any Home Care or Post Acute Services:  No  Do you feel like you have everything you need to keep you well at home?:  Yes  Care Transitions Interventions  No Identified Needs         Follow Up:  Spoke with patient. Reports that she is feeling better. Denies CP, palpitations or worsening symptoms. Instructed on s/s to report to MD.   Patient reports that she has all of her prescriptions and is taking her medication as directed. Reviewed discharge instructions and medications. Medication reconciliation completed. Patient denies any questions in regard to her new prescriptions. Patient reports that she is independent with her ADL's , has no issue with transportation or with the cost of her medications. Reports that her care needs are being met and denies the need for [address]. Denies any questions, equipment or resource needs. Patient has a follow up appointment with her PCP. Denies the need for further transition calls. [facility] contact information given. Encouraged patient to call if needs arise.   No further

## 2018-08-24 RX ORDER — DILTIAZEM HYDROCHLORIDE 240 MG/1
240 CAPSULE, COATED, EXTENDED RELEASE ORAL DAILY
Qty: 90 CAPSULE | Refills: 3 | Status: SHIPPED | OUTPATIENT
Start: 2018-08-24 | End: 2018-08-29 | Stop reason: SDUPTHER

## 2018-08-24 RX ORDER — PANTOPRAZOLE SODIUM 40 MG/1
40 TABLET, DELAYED RELEASE ORAL DAILY
Qty: 90 TABLET | Refills: 3 | Status: SHIPPED | OUTPATIENT
Start: 2018-08-24 | End: 2018-08-29 | Stop reason: SDUPTHER

## 2018-08-27 ENCOUNTER — OFFICE VISIT (OUTPATIENT)
Dept: INTERNAL MEDICINE CLINIC | Age: 78
End: 2018-08-27

## 2018-08-27 VITALS
SYSTOLIC BLOOD PRESSURE: 112 MMHG | HEART RATE: 74 BPM | WEIGHT: 155 LBS | RESPIRATION RATE: 16 BRPM | OXYGEN SATURATION: 98 % | DIASTOLIC BLOOD PRESSURE: 70 MMHG | BODY MASS INDEX: 27.46 KG/M2

## 2018-08-27 DIAGNOSIS — Z86.79 S/P ABLATION OF ATRIAL FIBRILLATION: ICD-10-CM

## 2018-08-27 DIAGNOSIS — Z98.890 S/P ABLATION OF ATRIAL FIBRILLATION: ICD-10-CM

## 2018-08-27 DIAGNOSIS — Z98.890 S/P ABLATION OF ATRIAL FLUTTER: ICD-10-CM

## 2018-08-27 DIAGNOSIS — Z86.79 S/P ABLATION OF ATRIAL FLUTTER: ICD-10-CM

## 2018-08-27 DIAGNOSIS — I48.0 PAROXYSMAL ATRIAL FIBRILLATION (HCC): ICD-10-CM

## 2018-08-27 DIAGNOSIS — J45.20 MILD INTERMITTENT ASTHMA, UNSPECIFIED WHETHER COMPLICATED: Primary | ICD-10-CM

## 2018-08-27 DIAGNOSIS — E03.9 HYPOTHYROIDISM, UNSPECIFIED TYPE: ICD-10-CM

## 2018-08-27 PROBLEM — I48.19 PERSISTENT ATRIAL FIBRILLATION (HCC): Status: RESOLVED | Noted: 2018-03-23 | Resolved: 2018-08-27

## 2018-08-27 PROCEDURE — 99214 OFFICE O/P EST MOD 30 MIN: CPT | Performed by: INTERNAL MEDICINE

## 2018-08-27 RX ORDER — ALBUTEROL SULFATE 90 UG/1
2 AEROSOL, METERED RESPIRATORY (INHALATION) EVERY 6 HOURS PRN
Qty: 1 INHALER | Refills: 3 | Status: SHIPPED | OUTPATIENT
Start: 2018-08-27 | End: 2021-02-18 | Stop reason: SDUPTHER

## 2018-08-27 NOTE — PROGRESS NOTES
levothyroxine (SYNTHROID) 88 MCG tablet TAKE 1 TABLET DAILY 90 tablet 1    gabapentin (NEURONTIN) 300 MG capsule Take 1 capsule by mouth nightly. 30 capsule 5    Multiple Vitamins-Iron (MULTIVITAMIN/IRON PO) Take 1 tablet by mouth daily. No current facility-administered medications for this visit. Past Medical History:   Diagnosis Date    Adenomatous colon polyp 11/2014    Atrial fibrillation with RVR (HCC) 03/18/2018    Chronic anticoagulation 03/2018    A Fib; Xarelto    Chronic radicular lumbar pain 9/2013    Left; Enrike Neas; Sujatha    Depression with anxiety 2004    Eczema     Hypothyroidism 7/2012    Insomnia     Osteoarthritis of thumb     base of thumbs bilateral    S/P ablation of atrial fibrillation 08/2018    S/P knee replacement     bilateral        Social History   Substance Use Topics    Smoking status: Never Smoker    Smokeless tobacco: Never Used    Alcohol use No      Comment: No caffeine        ROS: The patient has had no headache, sore throat, fever or chills, cough, dyspnea, chest pain, nausea, vomiting or diarrhea, or edema. Objective:      /70   Pulse 74   Resp 16   Wt 155 lb (70.3 kg)   SpO2 98%   BMI 27.46 kg/m²    General: in no apparent distress   The patient's neck is free of nodes. Lungs are clear. Heart is normal in rate and regular in rhythm. Legs are free of edema. No rash or erythema. Op note and gujja note rev'd     Assessment / Plan:      1. Mild intermittent asthma, unspecified whether complicated    2. S/P ablation of atrial fibrillation    3. S/P ablation of atrial flutter    4. Hypothyroidism, unspecified type    5.  Paroxysmal atrial fibrillation (HCC)            Plan   Try proventil prn; prior to exercise  Get PFTs  After PFTs done , may try pulmicort 90 bid if proventil helps  RTC 6 wk  F/u Gujja  Orders Placed This Encounter   Procedures    FULL PFT STUDY WITH PRE AND POST

## 2018-08-29 RX ORDER — PANTOPRAZOLE SODIUM 40 MG/1
40 TABLET, DELAYED RELEASE ORAL DAILY
Qty: 90 TABLET | Refills: 3 | Status: SHIPPED | OUTPATIENT
Start: 2018-08-29 | End: 2019-07-30 | Stop reason: SDUPTHER

## 2018-08-29 RX ORDER — DILTIAZEM HYDROCHLORIDE 240 MG/1
240 CAPSULE, COATED, EXTENDED RELEASE ORAL DAILY
Qty: 90 CAPSULE | Refills: 3 | Status: SHIPPED | OUTPATIENT
Start: 2018-08-29 | End: 2018-09-07 | Stop reason: SDUPTHER

## 2018-09-07 ENCOUNTER — OFFICE VISIT (OUTPATIENT)
Dept: CARDIOLOGY CLINIC | Age: 78
End: 2018-09-07

## 2018-09-07 VITALS
SYSTOLIC BLOOD PRESSURE: 110 MMHG | HEIGHT: 63 IN | WEIGHT: 162.6 LBS | DIASTOLIC BLOOD PRESSURE: 68 MMHG | HEART RATE: 65 BPM | BODY MASS INDEX: 28.81 KG/M2

## 2018-09-07 DIAGNOSIS — Z86.79 S/P ABLATION OF ATRIAL FIBRILLATION: Primary | ICD-10-CM

## 2018-09-07 DIAGNOSIS — Z98.890 S/P ABLATION OF ATRIAL FIBRILLATION: Primary | ICD-10-CM

## 2018-09-07 PROCEDURE — 99212 OFFICE O/P EST SF 10 MIN: CPT | Performed by: INTERNAL MEDICINE

## 2018-09-07 PROCEDURE — 93000 ELECTROCARDIOGRAM COMPLETE: CPT | Performed by: INTERNAL MEDICINE

## 2018-09-07 RX ORDER — DILTIAZEM HYDROCHLORIDE 120 MG/1
120 CAPSULE, COATED, EXTENDED RELEASE ORAL DAILY
Qty: 30 CAPSULE | Refills: 1 | Status: SHIPPED | OUTPATIENT
Start: 2018-09-07 | End: 2018-10-08 | Stop reason: DRUGHIGH

## 2018-09-26 DIAGNOSIS — I48.0 PAF (PAROXYSMAL ATRIAL FIBRILLATION) (HCC): ICD-10-CM

## 2018-09-26 RX ORDER — PROPAFENONE HYDROCHLORIDE 150 MG/1
150 TABLET, FILM COATED ORAL EVERY 8 HOURS
Qty: 270 TABLET | Refills: 1 | Status: SHIPPED | OUTPATIENT
Start: 2018-09-26 | End: 2019-01-14 | Stop reason: ALTCHOICE

## 2018-09-26 RX ORDER — PROPAFENONE HYDROCHLORIDE 150 MG/1
150 TABLET, FILM COATED ORAL EVERY 8 HOURS
Qty: 30 TABLET | Refills: 0 | Status: CANCELLED | OUTPATIENT
Start: 2018-09-26

## 2018-09-26 NOTE — TELEPHONE ENCOUNTER
Pharmacy needs us to reorder Rx for 90 days not 30. Also Patient is almost completely out she stated she will not have enough to get her through until [facility] gets them to her and she is asking if we can send a 5-10 day supply to [facility]. Please give her a call if that is a problem.

## 2018-10-08 ENCOUNTER — OFFICE VISIT (OUTPATIENT)
Dept: INTERNAL MEDICINE CLINIC | Age: 78
End: 2018-10-08
Payer: MEDICARE

## 2018-10-08 VITALS
HEART RATE: 53 BPM | RESPIRATION RATE: 16 BRPM | DIASTOLIC BLOOD PRESSURE: 66 MMHG | OXYGEN SATURATION: 97 % | SYSTOLIC BLOOD PRESSURE: 102 MMHG

## 2018-10-08 DIAGNOSIS — Z79.899 MEDICATION MANAGEMENT: ICD-10-CM

## 2018-10-08 DIAGNOSIS — Z79.01 CHRONIC ANTICOAGULATION: ICD-10-CM

## 2018-10-08 DIAGNOSIS — Z23 NEED FOR INFLUENZA VACCINATION: ICD-10-CM

## 2018-10-08 DIAGNOSIS — I48.0 PAF (PAROXYSMAL ATRIAL FIBRILLATION) (HCC): Primary | ICD-10-CM

## 2018-10-08 DIAGNOSIS — F51.01 PRIMARY INSOMNIA: ICD-10-CM

## 2018-10-08 PROCEDURE — G0008 ADMIN INFLUENZA VIRUS VAC: HCPCS | Performed by: INTERNAL MEDICINE

## 2018-10-08 PROCEDURE — 90662 IIV NO PRSV INCREASED AG IM: CPT | Performed by: INTERNAL MEDICINE

## 2018-10-08 PROCEDURE — 99213 OFFICE O/P EST LOW 20 MIN: CPT | Performed by: INTERNAL MEDICINE

## 2018-10-08 RX ORDER — DILTIAZEM HYDROCHLORIDE 240 MG/1
240 CAPSULE, COATED, EXTENDED RELEASE ORAL DAILY
COMMUNITY
End: 2018-12-07 | Stop reason: SDUPTHER

## 2018-10-08 RX ORDER — ZOLPIDEM TARTRATE 5 MG/1
5 TABLET ORAL NIGHTLY PRN
Qty: 30 TABLET | Refills: 1 | Status: SHIPPED | OUTPATIENT
Start: 2018-10-08 | End: 2018-12-20 | Stop reason: SDUPTHER

## 2018-10-08 NOTE — PROGRESS NOTES
Vaccine Information Sheet, \"Influenza - Inactivated\"  given to Jennifer Xiong, or parent/legal guardian of  Jennifer Xiong and verbalized understanding. Patient responses:    Have you ever had a reaction to a flu vaccine? No  Are you able to eat eggs without adverse effects? Yes  Do you have any current illness? No  Have you ever had Guillian [place] Syndrome? No    Flu vaccine given per order. Please see immunization tab.
into the lungs every 6 hours as needed for Wheezing 1 Inhaler 3    levothyroxine (SYNTHROID) 88 MCG tablet TAKE 1 TABLET DAILY 90 tablet 1    gabapentin (NEURONTIN) 300 MG capsule Take 1 capsule by mouth nightly. 30 capsule 5    Multiple Vitamins-Iron (MULTIVITAMIN/IRON PO) Take 1 tablet by mouth daily. No current facility-administered medications for this visit. Past Medical History:   Diagnosis Date    Adenomatous colon polyp 11/2014    Atrial fibrillation with RVR (HCC) 03/18/2018    Chronic anticoagulation 03/2018    A Fib; Xarelto    Chronic radicular lumbar pain 9/2013    Left; Anibalel Pile; Sujatha    Depression with anxiety 2004    Eczema     Hypothyroidism 7/2012    Insomnia     Osteoarthritis of thumb     base of thumbs bilateral    S/P ablation of atrial fibrillation 08/2018    S/P knee replacement     bilateral        Social History   Substance Use Topics    Smoking status: Never Smoker    Smokeless tobacco: Never Used    Alcohol use No      Comment: No caffeine        ROS: The patient has had no headache, sore throat, fever or chills, cough, dyspnea, chest pain, nausea, vomiting or diarrhea, or edema. Objective:      /66   Pulse 53   Resp 16   SpO2 97%    General: in no apparent distress   The patient's neck is free of nodes. Lungs are clear. Heart is normal in rate and regular in rhythm. Legs are free of edema. No rash or erythema. Prior lab rev;d       Assessment / Plan:      1. PAF (paroxysmal atrial fibrillation) ([address]    2. Chronic anticoagulation    3. Primary insomnia    4.  Medication management            Plan   Same meds  Refilled ambien : 30 pills / 1 refill  Flu shot today  RTC 3 mo  See Greta Lujan Dec 7

## 2018-12-03 RX ORDER — LEVOTHYROXINE SODIUM 88 UG/1
TABLET ORAL
Qty: 90 TABLET | Refills: 1 | Status: SHIPPED | OUTPATIENT
Start: 2018-12-03 | End: 2019-06-01 | Stop reason: SDUPTHER

## 2018-12-07 ENCOUNTER — OFFICE VISIT (OUTPATIENT)
Dept: CARDIOLOGY CLINIC | Age: 78
End: 2018-12-07
Payer: MEDICARE

## 2018-12-07 VITALS
HEIGHT: 63 IN | SYSTOLIC BLOOD PRESSURE: 108 MMHG | DIASTOLIC BLOOD PRESSURE: 66 MMHG | WEIGHT: 148.2 LBS | BODY MASS INDEX: 26.26 KG/M2 | HEART RATE: 62 BPM

## 2018-12-07 DIAGNOSIS — I48.19 PERSISTENT ATRIAL FIBRILLATION (HCC): ICD-10-CM

## 2018-12-07 DIAGNOSIS — Z86.79 S/P ABLATION OF ATRIAL FIBRILLATION: Primary | ICD-10-CM

## 2018-12-07 DIAGNOSIS — Z98.890 S/P ABLATION OF ATRIAL FIBRILLATION: Primary | ICD-10-CM

## 2018-12-07 PROCEDURE — 99212 OFFICE O/P EST SF 10 MIN: CPT | Performed by: INTERNAL MEDICINE

## 2018-12-07 PROCEDURE — 93000 ELECTROCARDIOGRAM COMPLETE: CPT | Performed by: INTERNAL MEDICINE

## 2018-12-07 RX ORDER — DILTIAZEM HYDROCHLORIDE 120 MG/1
120 CAPSULE, COATED, EXTENDED RELEASE ORAL DAILY
Qty: 30 CAPSULE | Refills: 3 | Status: SHIPPED | OUTPATIENT
Start: 2018-12-07 | End: 2019-03-04 | Stop reason: SDUPTHER

## 2018-12-20 DIAGNOSIS — F51.01 PRIMARY INSOMNIA: ICD-10-CM

## 2018-12-20 RX ORDER — ZOLPIDEM TARTRATE 5 MG/1
5 TABLET ORAL NIGHTLY PRN
Qty: 30 TABLET | Refills: 1 | Status: SHIPPED | OUTPATIENT
Start: 2018-12-20 | End: 2019-01-26 | Stop reason: SDUPTHER

## 2019-01-10 ENCOUNTER — TELEPHONE (OUTPATIENT)
Dept: CARDIOLOGY CLINIC | Age: 79
End: 2019-01-10

## 2019-01-11 ENCOUNTER — OFFICE VISIT (OUTPATIENT)
Dept: CARDIOLOGY CLINIC | Age: 79
End: 2019-01-11
Payer: MEDICARE

## 2019-01-11 ENCOUNTER — NURSE ONLY (OUTPATIENT)
Dept: CARDIOLOGY CLINIC | Age: 79
End: 2019-01-11
Payer: MEDICARE

## 2019-01-11 VITALS
SYSTOLIC BLOOD PRESSURE: 130 MMHG | BODY MASS INDEX: 27.14 KG/M2 | DIASTOLIC BLOOD PRESSURE: 84 MMHG | HEIGHT: 63 IN | WEIGHT: 153.2 LBS | HEART RATE: 68 BPM

## 2019-01-11 DIAGNOSIS — Z79.01 CHRONIC ANTICOAGULATION: ICD-10-CM

## 2019-01-11 DIAGNOSIS — Z98.890 S/P ABLATION OF ATRIAL FLUTTER: ICD-10-CM

## 2019-01-11 DIAGNOSIS — Z98.890 S/P ABLATION OF ATRIAL FIBRILLATION: ICD-10-CM

## 2019-01-11 DIAGNOSIS — Z01.810 ENCOUNTER FOR PRE-OPERATIVE CARDIOVASCULAR CLEARANCE: ICD-10-CM

## 2019-01-11 DIAGNOSIS — Z86.79 S/P ABLATION OF ATRIAL FIBRILLATION: ICD-10-CM

## 2019-01-11 DIAGNOSIS — Z98.890 S/P ABLATION OF ATRIAL FLUTTER: Primary | ICD-10-CM

## 2019-01-11 DIAGNOSIS — Z86.79 S/P ABLATION OF ATRIAL FLUTTER: ICD-10-CM

## 2019-01-11 DIAGNOSIS — I48.0 PAROXYSMAL ATRIAL FIBRILLATION (HCC): ICD-10-CM

## 2019-01-11 DIAGNOSIS — Z86.79 S/P ABLATION OF ATRIAL FLUTTER: Primary | ICD-10-CM

## 2019-01-11 DIAGNOSIS — I49.9 IRREGULAR HEART RATE: Primary | ICD-10-CM

## 2019-01-11 PROCEDURE — 93225 XTRNL ECG REC<48 HRS REC: CPT | Performed by: INTERNAL MEDICINE

## 2019-01-11 PROCEDURE — 93000 ELECTROCARDIOGRAM COMPLETE: CPT | Performed by: NURSE PRACTITIONER

## 2019-01-11 PROCEDURE — 99213 OFFICE O/P EST LOW 20 MIN: CPT | Performed by: NURSE PRACTITIONER

## 2019-01-11 RX ORDER — MULTIVITAMIN/IRON/FOLIC ACID 18MG-0.4MG
250 TABLET ORAL DAILY
Qty: 30 TABLET | Refills: 3 | Status: SHIPPED | OUTPATIENT
Start: 2019-01-11 | End: 2019-02-07 | Stop reason: SDUPTHER

## 2019-01-14 ENCOUNTER — OFFICE VISIT (OUTPATIENT)
Dept: INTERNAL MEDICINE CLINIC | Age: 79
End: 2019-01-14
Payer: MEDICARE

## 2019-01-14 VITALS
OXYGEN SATURATION: 93 % | DIASTOLIC BLOOD PRESSURE: 70 MMHG | HEART RATE: 73 BPM | RESPIRATION RATE: 16 BRPM | SYSTOLIC BLOOD PRESSURE: 138 MMHG

## 2019-01-14 DIAGNOSIS — I48.0 PAROXYSMAL ATRIAL FIBRILLATION (HCC): ICD-10-CM

## 2019-01-14 DIAGNOSIS — Z01.810 ENCOUNTER FOR PRE-OPERATIVE CARDIOVASCULAR CLEARANCE: ICD-10-CM

## 2019-01-14 DIAGNOSIS — Z86.79 S/P ABLATION OF ATRIAL FLUTTER: ICD-10-CM

## 2019-01-14 DIAGNOSIS — Z79.899 MEDICATION MANAGEMENT: ICD-10-CM

## 2019-01-14 DIAGNOSIS — F41.9 ANXIETY: ICD-10-CM

## 2019-01-14 DIAGNOSIS — E78.2 MIXED HYPERLIPIDEMIA: ICD-10-CM

## 2019-01-14 DIAGNOSIS — I48.0 PAROXYSMAL ATRIAL FIBRILLATION (HCC): Primary | ICD-10-CM

## 2019-01-14 DIAGNOSIS — Z98.890 S/P ABLATION OF ATRIAL FLUTTER: ICD-10-CM

## 2019-01-14 DIAGNOSIS — Z86.79 S/P ABLATION OF ATRIAL FIBRILLATION: ICD-10-CM

## 2019-01-14 DIAGNOSIS — Z98.890 S/P ABLATION OF ATRIAL FIBRILLATION: ICD-10-CM

## 2019-01-14 DIAGNOSIS — F51.01 PRIMARY INSOMNIA: ICD-10-CM

## 2019-01-14 DIAGNOSIS — E03.9 HYPOTHYROIDISM, UNSPECIFIED TYPE: ICD-10-CM

## 2019-01-14 DIAGNOSIS — Z79.01 CHRONIC ANTICOAGULATION: ICD-10-CM

## 2019-01-14 LAB
ANION GAP SERPL CALCULATED.3IONS-SCNC: 12 MMOL/L (ref 3–16)
BUN BLDV-MCNC: 23 MG/DL (ref 7–20)
CALCIUM SERPL-MCNC: 9.2 MG/DL (ref 8.3–10.6)
CHLORIDE BLD-SCNC: 104 MMOL/L (ref 99–110)
CO2: 27 MMOL/L (ref 21–32)
CREAT SERPL-MCNC: 0.7 MG/DL (ref 0.6–1.2)
GFR AFRICAN AMERICAN: >60
GFR NON-AFRICAN AMERICAN: >60
GLUCOSE BLD-MCNC: 91 MG/DL (ref 70–99)
MAGNESIUM: 2.2 MG/DL (ref 1.8–2.4)
POTASSIUM SERPL-SCNC: 4.7 MMOL/L (ref 3.5–5.1)
SODIUM BLD-SCNC: 143 MMOL/L (ref 136–145)

## 2019-01-14 PROCEDURE — 99214 OFFICE O/P EST MOD 30 MIN: CPT | Performed by: INTERNAL MEDICINE

## 2019-01-14 RX ORDER — CITALOPRAM 20 MG/1
20 TABLET ORAL DAILY
Qty: 90 TABLET | Refills: 1 | Status: SHIPPED | OUTPATIENT
Start: 2019-01-14 | End: 2019-06-08 | Stop reason: SDUPTHER

## 2019-01-17 PROCEDURE — 93227 XTRNL ECG REC<48 HR R&I: CPT | Performed by: INTERNAL MEDICINE

## 2019-01-26 DIAGNOSIS — F51.01 PRIMARY INSOMNIA: ICD-10-CM

## 2019-01-28 RX ORDER — ZOLPIDEM TARTRATE 5 MG/1
TABLET ORAL
Qty: 30 TABLET | Refills: 1 | Status: SHIPPED | OUTPATIENT
Start: 2019-01-28 | End: 2019-02-28 | Stop reason: SDUPTHER

## 2019-02-08 RX ORDER — MULTIVITAMIN/IRON/FOLIC ACID 18MG-0.4MG
250 TABLET ORAL DAILY
Qty: 30 TABLET | Refills: 5 | Status: SHIPPED | OUTPATIENT
Start: 2019-02-08

## 2019-02-28 DIAGNOSIS — F51.01 PRIMARY INSOMNIA: ICD-10-CM

## 2019-03-01 RX ORDER — ZOLPIDEM TARTRATE 5 MG/1
TABLET ORAL
Qty: 30 TABLET | Refills: 1 | Status: SHIPPED | OUTPATIENT
Start: 2019-03-01 | End: 2019-04-15 | Stop reason: SDUPTHER

## 2019-03-05 RX ORDER — GABAPENTIN 300 MG/1
CAPSULE ORAL
Qty: 90 CAPSULE | Refills: 1 | Status: SHIPPED | OUTPATIENT
Start: 2019-03-05 | End: 2019-07-30 | Stop reason: ALTCHOICE

## 2019-03-08 RX ORDER — DILTIAZEM HYDROCHLORIDE 120 MG/1
CAPSULE, EXTENDED RELEASE ORAL
Qty: 90 CAPSULE | Refills: 1 | Status: SHIPPED | OUTPATIENT
Start: 2019-03-08 | End: 2020-01-06 | Stop reason: SDUPTHER

## 2019-03-19 DIAGNOSIS — I48.0 PAF (PAROXYSMAL ATRIAL FIBRILLATION) (HCC): ICD-10-CM

## 2019-03-19 RX ORDER — PROPAFENONE HYDROCHLORIDE 150 MG/1
TABLET, FILM COATED ORAL
Qty: 270 TABLET | Refills: 1 | OUTPATIENT
Start: 2019-03-19

## 2019-04-12 ENCOUNTER — OFFICE VISIT (OUTPATIENT)
Dept: CARDIOLOGY CLINIC | Age: 79
End: 2019-04-12
Payer: MEDICARE

## 2019-04-12 VITALS
BODY MASS INDEX: 27.14 KG/M2 | SYSTOLIC BLOOD PRESSURE: 114 MMHG | HEART RATE: 68 BPM | OXYGEN SATURATION: 97 % | WEIGHT: 159 LBS | HEIGHT: 64 IN | DIASTOLIC BLOOD PRESSURE: 76 MMHG

## 2019-04-12 DIAGNOSIS — I48.19 PERSISTENT ATRIAL FIBRILLATION (HCC): ICD-10-CM

## 2019-04-12 PROCEDURE — 99213 OFFICE O/P EST LOW 20 MIN: CPT | Performed by: INTERNAL MEDICINE

## 2019-04-12 PROCEDURE — 93000 ELECTROCARDIOGRAM COMPLETE: CPT | Performed by: INTERNAL MEDICINE

## 2019-04-12 NOTE — PROGRESS NOTES
Electrophysiology FU Note      Reason for consultation: ATRIAL FIBRILLATION    Chief complaint : 3 month follow up    Referring physician:  Ursula Bernardo      Primary care physician: Frida Pollard MD      History of Present Illness: This visit (4/12/2019)      Chief Complaint   Patient presents with   Nnamdi Elizabeth patient here for 3 mo f/u following NP visit. Patient denies CP, SOB, palpitations, dizziness or edema. Patient denies alcohol or caffeine. Is it okay for patient to get OTC Magnesium instead of prescription. Previous visit: (12/7/2018)      Chief Complaint   Patient presents with    Atrial Fibrillation     Patient is here for 3 month check up. Patient denies any chest pain, shortness of breath, edema, and palpitations. Previous visit: (9/7/2018)      Chief Complaint   Patient presents with    Atrial Fibrillation     Pt is here for follow up ablation 8/20. Pt states when she take rhythmol she gets dizzy. Pt denies chest pain, shortness of breath, palpitations, and edema. Previous visit: (7/20/2018)      Chief Complaint   Patient presents with    Atrial Fibrillation     Pt has HX of afib. Pt states she can feel palpitations, they seem to be worse at night. She also has feet/ankle edema. Pt denies CP, SOB, or dizziness. Pt is now off of celexa and wants to disscuss anit rhythmic medications. Previous visit:    Chief Complaint   Patient presents with   Nnamdi Elizabeth referred patient for a-fib. she states just sent 30 day event monitor back this morning. She has had a few episodes of SOB with palpitations. Makes her feel tired and weak. Having been going on for some time now. Patient is on xarelto for atrial fibrillation. Also taking cardizem. Patient denies any chest pain, dizziness, or edema. also no problems with bleeding, anesthesia and denies having sleep apena. Past medical history:   Past Medical History:   Diagnosis Date    Adenomatous colon polyp 2014    Atrial fibrillation with RVR ([address] 2018    Chronic anticoagulation 2018    A Fib; Xarelto    Chronic radicular lumbar pain 2013    Left; Tiny Sparrow; [place]    Depression with anxiety 2004    Eczema     History of Holter monitoring 2019    Blocked APCS    Hypothyroidism 2012    Insomnia     Osteoarthritis of thumb     base of thumbs bilateral    S/P ablation of atrial fibrillation 2018    S/P knee replacement     bilateral       Surgical history :   Past Surgical History:   Procedure Laterality Date    ABLATION OF DYSRHYTHMIC FOCUS  2018    BREAST BIOPSY Bilateral      SECTION      X 3    TOTAL KNEE ARTHROPLASTY Right 2017    [facility] TOTAL KNEE ARTHROPLASTY Left 2018    Dr Pappas       Family history: No sudden cardiac death    Social history :  reports that she has never smoked. She has never used smokeless tobacco. She reports that she does not drink alcohol or use drugs.     No Known Allergies    Current Outpatient Medications on File Prior to Visit   Medication Sig Dispense Refill    CARTIA  MG extended release capsule TAKE 1 CAPSULE DAILY 90 capsule 1    gabapentin (NEURONTIN) 300 MG capsule TAKE 1 CAPSULE NIGHTLY 90 capsule 1    Magnesium Oxide 250 MG TABS Take 1 tablet by mouth daily 30 tablet 5    citalopram (CELEXA) 20 MG tablet Take 1 tablet by mouth daily 90 tablet 1    levothyroxine (SYNTHROID) 88 MCG tablet TAKE 1 TABLET DAILY 90 tablet 1    rivaroxaban (XARELTO) 20 MG TABS tablet Take 1 tablet by mouth daily 90 tablet 3    pantoprazole (PROTONIX) 40 MG tablet Take 1 tablet by mouth daily 90 tablet 3    albuterol sulfate HFA (PROVENTIL HFA) 108 (90 Base) MCG/ACT inhaler Inhale 2 puffs into the lungs every 6 hours as needed for Wheezing 1 Inhaler 3    Multiple Vitamins-Iron (MULTIVITAMIN/IRON PO) Take 1 Exam reveals no friction rub. No murmur heard. Pulmonary/Chest: Effort normal and breath sounds normal. No stridor. No respiratory distress. She has no wheezes. Abdominal: Soft. Bowel sounds are normal. She exhibits no distension. There is no tenderness. Musculoskeletal: Normal range of motion. She exhibits no edema or tenderness. Neurological: She is alert and oriented to person, place, and time. No cranial nerve deficit. Skin: Skin is warm and dry. No rash noted. No erythema. Psychiatric: Mood and affect normal.         CBC:   Lab Results   Component Value Date    WBC 8.0 08/21/2018    HGB 10.5 08/21/2018    HCT 33.6 08/21/2018     08/21/2018     Lipids:   Lab Results   Component Value Date    CHOL 178 07/19/2016    TRIG 60 07/19/2016    HDL 75 (H) 07/19/2016    LDLCALC 91 07/19/2016    LDLDIRECT 100 (H) 07/06/2012     PT/INR:   Lab Results   Component Value Date    INR 1.23 08/17/2018        BMP:    Lab Results   Component Value Date     01/14/2019    K 4.7 01/14/2019     01/14/2019    CO2 27 01/14/2019    BUN 23 (H) 01/14/2019     CMP:   Lab Results   Component Value Date    AST 16 03/18/2018    PROT 6.6 03/18/2018    BILITOT 0.3 03/18/2018    ALKPHOS 86 03/18/2018     TSH:    Lab Results   Component Value Date    TSH 1.12 07/19/2016       EKGINTERPRETATION - EKG Interpretation:  Sinus rhythm      IMPRESSION / RECOMMENDATIONS:     1. Sp ablation of atrial fibrillation and flutter  2. Depression  3. Hypothyroid on medical therapy  4. Chronic back pain  5. Arthralgia    Patient feels great  Her basal cell surgery went well too  No complaints  She is off rhythmol too now  On xarelto    She enquired about her son who has Septal hypertrophy and who needs surgical evaluation. Explained her about condition. He lives in [place] and he is going to [facility] [employer] [facility] for surgery.     Fu with     Continue anticoagulation    Thanks again for allowing me to participate in care of this patient. Please call me if you have any questions. With best regards.       Ninfa Claude, MD

## 2019-04-15 ENCOUNTER — OFFICE VISIT (OUTPATIENT)
Dept: INTERNAL MEDICINE CLINIC | Age: 79
End: 2019-04-15
Payer: MEDICARE

## 2019-04-15 VITALS
HEART RATE: 84 BPM | RESPIRATION RATE: 16 BRPM | WEIGHT: 153 LBS | SYSTOLIC BLOOD PRESSURE: 122 MMHG | BODY MASS INDEX: 26.12 KG/M2 | DIASTOLIC BLOOD PRESSURE: 70 MMHG | HEIGHT: 64 IN | OXYGEN SATURATION: 98 %

## 2019-04-15 DIAGNOSIS — F51.01 PRIMARY INSOMNIA: Primary | ICD-10-CM

## 2019-04-15 DIAGNOSIS — I48.0 PAROXYSMAL ATRIAL FIBRILLATION (HCC): ICD-10-CM

## 2019-04-15 DIAGNOSIS — E78.2 MIXED HYPERLIPIDEMIA: ICD-10-CM

## 2019-04-15 DIAGNOSIS — F41.9 ANXIETY: ICD-10-CM

## 2019-04-15 DIAGNOSIS — E03.9 HYPOTHYROIDISM, UNSPECIFIED TYPE: ICD-10-CM

## 2019-04-15 PROCEDURE — 99213 OFFICE O/P EST LOW 20 MIN: CPT | Performed by: INTERNAL MEDICINE

## 2019-04-15 PROCEDURE — G8510 SCR DEP NEG, NO PLAN REQD: HCPCS | Performed by: INTERNAL MEDICINE

## 2019-04-15 RX ORDER — ZOLPIDEM TARTRATE 5 MG/1
5 TABLET ORAL NIGHTLY PRN
Qty: 30 TABLET | Refills: 1 | Status: SHIPPED | OUTPATIENT
Start: 2019-04-15 | End: 2019-05-16 | Stop reason: SDUPTHER

## 2019-04-15 ASSESSMENT — PATIENT HEALTH QUESTIONNAIRE - PHQ9
SUM OF ALL RESPONSES TO PHQ9 QUESTIONS 1 & 2: 0
SUM OF ALL RESPONSES TO PHQ QUESTIONS 1-9: 0
1. LITTLE INTEREST OR PLEASURE IN DOING THINGS: 0
SUM OF ALL RESPONSES TO PHQ QUESTIONS 1-9: 0
2. FEELING DOWN, DEPRESSED OR HOPELESS: 0

## 2019-04-15 NOTE — PROGRESS NOTES
Subjective:      Davy Choi is a 69 y.o. female who presents today for follow up on her chronic medical conditions as noted below. Patient Active Problem List:     Eczema     Chronic radicular lumbar pain     Chronic anticoagulation     Paroxysmal atrial fibrillation (HCC)     Medication management     S/P ablation of atrial flutter     S/P ablation of atrial fibrillation     She was last seen in Jan and I added back celexa for anxiety and active mind when trying to sleep    She is working out   Still has trouble sleeping 2-3 ninght /wk  Still uses ambien prn    Controlled Substances Monitoring:     RX Monitoring 1/14/2019   Attestation The Prescription Monitoring Report for this patient was reviewed today. Chronic Pain Routine Monitoring Possible medication side effects, risk of tolerance/dependence & alternative treatments discussed. ;No signs of potential drug abuse or diversion identified. Saw Blair Kolb last week and A fib is ablated and inactive    Patient denies any exertional chest pain, dyspnea, palpitations, syncope, orthopnea, edema or paroxysmal nocturnal dyspnea. Emotionally fine    Current Outpatient Medications   Medication Sig Dispense Refill    zolpidem (AMBIEN) 5 MG tablet Take 1 tablet by mouth nightly as needed for Sleep for up to 90 days.  30 tablet 1    CARTIA  MG extended release capsule TAKE 1 CAPSULE DAILY 90 capsule 1    gabapentin (NEURONTIN) 300 MG capsule TAKE 1 CAPSULE NIGHTLY 90 capsule 1    Magnesium Oxide 250 MG TABS Take 1 tablet by mouth daily 30 tablet 5    citalopram (CELEXA) 20 MG tablet Take 1 tablet by mouth daily 90 tablet 1    levothyroxine (SYNTHROID) 88 MCG tablet TAKE 1 TABLET DAILY 90 tablet 1    rivaroxaban (XARELTO) 20 MG TABS tablet Take 1 tablet by mouth daily 90 tablet 3    pantoprazole (PROTONIX) 40 MG tablet Take 1 tablet by mouth daily 90 tablet 3    albuterol sulfate HFA (PROVENTIL HFA) 108 (90 Base) MCG/ACT inhaler Inhale 2 puffs into the lungs every 6 hours as needed for Wheezing 1 Inhaler 3    Multiple Vitamins-Iron (MULTIVITAMIN/IRON PO) Take 1 tablet by mouth daily. No current facility-administered medications for this visit. Past Medical History:   Diagnosis Date    Adenomatous colon polyp 11/2014    Atrial fibrillation with RVR (HCC) 03/18/2018    Chronic anticoagulation 03/2018    A Fib; Xarelto    Chronic radicular lumbar pain 9/2013    Left; Faythe Skill; [place]    Depression with anxiety 2004    Eczema     History of Holter monitoring 01/11/2019    Blocked APCS    Hypothyroidism 7/2012    Insomnia     Osteoarthritis of thumb     base of thumbs bilateral    S/P ablation of atrial fibrillation 08/2018    S/P knee replacement     bilateral        Social History     Tobacco Use    Smoking status: Never Smoker    Smokeless tobacco: Never Used   Substance Use Topics    Alcohol use: No     Comment: No caffeine        ROS: The patient has had no headache, sore throat, fever or chills, cough, dyspnea, chest pain, nausea, vomiting or diarrhea, or edema. Objective:      /70 (Site: Left Upper Arm, Position: Sitting, Cuff Size: Medium Adult)   Pulse 84   Resp 16   Ht 5' 4\" (1.626 m)   Wt 153 lb (69.4 kg)   SpO2 98%   BMI 26.26 kg/m²      Physical Exam   Constitutional: She is oriented to person, place, and time. She appears well-developed and well-nourished. No distress. HENT:   Head: Normocephalic and atraumatic. Mouth/Throat: Oropharynx is clear and moist.   Eyes: Conjunctivae are normal. No scleral icterus. Neck: Neck supple. Cardiovascular: Normal rate and regular rhythm. No murmur heard. Pulmonary/Chest: Effort normal. No respiratory distress. She has no wheezes. She has no rales. Abdominal: Soft. She exhibits no distension. There is no tenderness. Musculoskeletal: Normal range of motion. Neurological: She is alert and oriented to person, place, and time.  Coordination 1 person assist

## 2019-04-17 ENCOUNTER — TELEPHONE (OUTPATIENT)
Dept: INTERNAL MEDICINE CLINIC | Age: 79
End: 2019-04-17

## 2019-05-16 DIAGNOSIS — F51.01 PRIMARY INSOMNIA: ICD-10-CM

## 2019-05-16 RX ORDER — ZOLPIDEM TARTRATE 5 MG/1
TABLET ORAL
Qty: 30 TABLET | Refills: 1 | Status: SHIPPED | OUTPATIENT
Start: 2019-05-16 | End: 2019-07-17 | Stop reason: SDUPTHER

## 2019-05-16 NOTE — TELEPHONE ENCOUNTER
Controlled Substances Monitoring:     RX Monitoring 5/16/2019   Attestation The Prescription Monitoring Report for this patient was reviewed today.    Chronic Pain Routine Monitoring No signs of potential drug abuse or diversion identified: otherwise, see note documentation

## 2019-06-03 RX ORDER — LEVOTHYROXINE SODIUM 88 UG/1
TABLET ORAL
Qty: 90 TABLET | Refills: 1 | Status: SHIPPED | OUTPATIENT
Start: 2019-06-03 | End: 2019-11-30 | Stop reason: SDUPTHER

## 2019-06-10 RX ORDER — CITALOPRAM 20 MG/1
TABLET ORAL
Qty: 90 TABLET | Refills: 1 | Status: SHIPPED | OUTPATIENT
Start: 2019-06-10 | End: 2019-12-05 | Stop reason: SDUPTHER

## 2019-06-12 ENCOUNTER — TELEPHONE (OUTPATIENT)
Dept: CARDIOLOGY CLINIC | Age: 79
End: 2019-06-12

## 2019-06-12 NOTE — TELEPHONE ENCOUNTER
Faxed records release request to [facility] on  6/12/19 for [address] [place] [address] to [phone]. Call [phone]  Option 2 to check on progress.

## 2019-07-17 DIAGNOSIS — F51.01 PRIMARY INSOMNIA: ICD-10-CM

## 2019-07-17 RX ORDER — ZOLPIDEM TARTRATE 5 MG/1
TABLET ORAL
Qty: 30 TABLET | Refills: 2 | Status: SHIPPED | OUTPATIENT
Start: 2019-07-17 | End: 2019-10-15

## 2019-07-18 ENCOUNTER — HOSPITAL ENCOUNTER (OUTPATIENT)
Age: 79
Discharge: HOME | End: 2019-07-18
Payer: MEDICARE

## 2019-07-18 LAB
ALBUMIN SERPL-MCNC: 4.2 GM/DL (ref 3.4–5)
ALP BLD-CCNC: 76 IU/L (ref 40–128)
ALT SERPL-CCNC: 9 U/L (ref 10–40)
ANION GAP SERPL CALCULATED.3IONS-SCNC: 11 MMOL/L (ref 4–16)
AST SERPL-CCNC: 15 IU/L (ref 15–37)
BASOPHILS ABSOLUTE: 0 K/CU MM
BASOPHILS RELATIVE PERCENT: 0.5 % (ref 0–1)
BILIRUB SERPL-MCNC: 0.4 MG/DL (ref 0–1)
BUN BLDV-MCNC: 18 MG/DL (ref 6–23)
CALCIUM SERPL-MCNC: 9.4 MG/DL (ref 8.3–10.6)
CHLORIDE BLD-SCNC: 104 MMOL/L (ref 99–110)
CHOLESTEROL: 180 MG/DL
CO2: 28 MMOL/L (ref 21–32)
CREAT SERPL-MCNC: 0.7 MG/DL (ref 0.6–1.1)
DIFFERENTIAL TYPE: ABNORMAL
EOSINOPHILS ABSOLUTE: 0.2 K/CU MM
EOSINOPHILS RELATIVE PERCENT: 4.1 % (ref 0–3)
GFR AFRICAN AMERICAN: >60 ML/MIN/1.73M2
GFR NON-AFRICAN AMERICAN: >60 ML/MIN/1.73M2
GLUCOSE BLD-MCNC: 100 MG/DL (ref 70–99)
HCT VFR BLD CALC: 40.2 % (ref 37–47)
HDLC SERPL-MCNC: 72 MG/DL
HEMOGLOBIN: 12.9 GM/DL (ref 12.5–16)
IMMATURE NEUTROPHIL %: 0.2 % (ref 0–0.43)
LDL CHOLESTEROL DIRECT: 114 MG/DL
LYMPHOCYTES ABSOLUTE: 1.6 K/CU MM
LYMPHOCYTES RELATIVE PERCENT: 38.7 % (ref 24–44)
MCH RBC QN AUTO: 31.2 PG (ref 27–31)
MCHC RBC AUTO-ENTMCNC: 32.1 % (ref 32–36)
MCV RBC AUTO: 97.3 FL (ref 78–100)
MONOCYTES ABSOLUTE: 0.3 K/CU MM
MONOCYTES RELATIVE PERCENT: 7.6 % (ref 0–4)
NUCLEATED RBC %: 0 %
PDW BLD-RTO: 12.7 % (ref 11.7–14.9)
PLATELET # BLD: 172 K/CU MM (ref 140–440)
PMV BLD AUTO: 10.4 FL (ref 7.5–11.1)
POTASSIUM SERPL-SCNC: 4.3 MMOL/L (ref 3.5–5.1)
RBC # BLD: 4.13 M/CU MM (ref 4.2–5.4)
SEGMENTED NEUTROPHILS ABSOLUTE COUNT: 2.1 K/CU MM
SEGMENTED NEUTROPHILS RELATIVE PERCENT: 48.9 % (ref 36–66)
SODIUM BLD-SCNC: 143 MMOL/L (ref 135–145)
TOTAL IMMATURE NEUTOROPHIL: 0.01 K/CU MM
TOTAL NUCLEATED RBC: 0 K/CU MM
TOTAL PROTEIN: 6.6 GM/DL (ref 6.4–8.2)
TRIGL SERPL-MCNC: 65 MG/DL
TSH HIGH SENSITIVITY: 1.21 UIU/ML (ref 0.27–4.2)
WBC # BLD: 4.2 K/CU MM (ref 4–10.5)

## 2019-07-18 PROCEDURE — 36415 COLL VENOUS BLD VENIPUNCTURE: CPT

## 2019-07-18 PROCEDURE — 80061 LIPID PANEL: CPT

## 2019-07-18 PROCEDURE — 83721 ASSAY OF BLOOD LIPOPROTEIN: CPT

## 2019-07-18 PROCEDURE — 84443 ASSAY THYROID STIM HORMONE: CPT

## 2019-07-18 PROCEDURE — 85025 COMPLETE CBC W/AUTO DIFF WBC: CPT

## 2019-07-18 PROCEDURE — 80053 COMPREHEN METABOLIC PANEL: CPT

## 2019-07-30 ENCOUNTER — OFFICE VISIT (OUTPATIENT)
Dept: INTERNAL MEDICINE CLINIC | Age: 79
End: 2019-07-30
Payer: MEDICARE

## 2019-07-30 VITALS — DIASTOLIC BLOOD PRESSURE: 82 MMHG | OXYGEN SATURATION: 97 % | SYSTOLIC BLOOD PRESSURE: 120 MMHG | HEART RATE: 70 BPM

## 2019-07-30 DIAGNOSIS — Z12.39 BREAST CANCER SCREENING: ICD-10-CM

## 2019-07-30 DIAGNOSIS — G47.9 SLEEP DISORDER: Primary | ICD-10-CM

## 2019-07-30 DIAGNOSIS — Z86.79 S/P ABLATION OF ATRIAL FIBRILLATION: ICD-10-CM

## 2019-07-30 DIAGNOSIS — Z98.890 S/P ABLATION OF ATRIAL FIBRILLATION: ICD-10-CM

## 2019-07-30 DIAGNOSIS — Z79.899 MEDICATION MANAGEMENT: ICD-10-CM

## 2019-07-30 PROCEDURE — 3288F FALL RISK ASSESSMENT DOCD: CPT | Performed by: INTERNAL MEDICINE

## 2019-07-30 PROCEDURE — 99213 OFFICE O/P EST LOW 20 MIN: CPT | Performed by: INTERNAL MEDICINE

## 2019-07-30 RX ORDER — PANTOPRAZOLE SODIUM 40 MG/1
40 TABLET, DELAYED RELEASE ORAL DAILY
Qty: 90 TABLET | Refills: 3 | Status: SHIPPED | OUTPATIENT
Start: 2019-07-30 | End: 2020-01-16 | Stop reason: SDUPTHER

## 2019-07-30 NOTE — PATIENT INSTRUCTIONS
methylene blue injection, phenelzine, rasagiline, selegiline, tranylcypromine, and others. To make sure mirtazepine is safe for you, tell your doctor if you have:  · liver or kidney disease;  · narrow-angle glaucoma;  · bipolar disorder (manic depression);  · seizures or epilepsy;  · low blood pressure or dizzy spells;  · high cholesterol or triglycerides;  · heart disease, including angina (chest pain);  · a history of heart attack or stroke; or  · a history of drug abuse or suicidal thoughts. Some young people have thoughts about suicide when first taking an antidepressant. Your doctor will need to check your progress at regular visits while you are using mirtazepine. Your family or other caregivers should also be alert to changes in your mood or symptoms. It is not known whether mirtazapine will harm an unborn baby. Tell your doctor if you are pregnant or plan to become pregnant while using this medication. It is not known whether mirtazepine passes into breast milk or if it could harm a nursing baby. Tell your doctor if you are breast-feeding a baby. The orally disintegrating tablet may contain phenylalanine. Talk to your doctor before using this form of mirtazapine if you have phenylketonuria (PKU). How should I take mirtazapine? Follow all directions on your prescription label. Do not take this medicine in larger or smaller amounts or for longer than recommended. Mirtazapine is usually taken once a day at bedtime. Follow your doctor's instructions. Take the regular tablet form of mirtazapine with water. To take the orally disintegrating tablet (Remeron SolTab):  · Keep the tablet in its blister pack until you are ready to take it. Open the package and peel back the foil. Do not push a tablet through the foil or you may damage the tablet. · Use dry hands to remove the tablet and place it in your mouth. · Do not swallow the tablet whole. Allow it to dissolve in your mouth without chewing.  If desired, you may drink liquid to help swallow the dissolved tablet. It may take up to several weeks before your symptoms improve. Keep using the medication as directed and tell your doctor if your symptoms do not improve after 4 weeks of treatment. Do not stop using mirtazepine suddenly, or you could have unpleasant withdrawal symptoms. Ask your doctor how to safely stop using mirtazepine. Store at room temperature away from moisture, heat, and light. What happens if I miss a dose? Take the missed dose as soon as you remember. Skip the missed dose if it is almost time for your next scheduled dose. Do not  take extra medicine to make up the missed dose. What happens if I overdose? Seek emergency medical attention or call the Poison Help line at 1-655.594.3732. Overdose symptoms may include confusion, memory problems, drowsiness, and fast heart rate. What should I avoid while taking mirtazapine? Avoid drinking alcohol. It may increase certain side effects of mirtazapine. Mirtazapine may impair your thinking or reactions. Be careful if you drive or do anything that requires you to be alert. What are the possible side effects of mirtazapine? Get emergency medical help if you have signs of an allergic reaction: skin rash or hives; difficult breathing; swelling of your face, lips, tongue, or throat. Report any new or worsening symptoms to your doctor, such as: mood or behavior changes, anxiety, panic attacks, trouble sleeping, or if you feel impulsive, irritable, agitated, hostile, aggressive, restless, hyperactive (mentally or physically), more depressed, or have thoughts about suicide or hurting yourself.   Call your doctor at once if you have:  · racing thoughts, decreased need for sleep, unusual risk-taking behavior, feelings of extreme happiness or sadness, being more talkative than usual;  · blurred vision, tunnel vision, eye pain or swelling, or seeing halos around lights;  · a light-headed feeling, like you might pass out;  · changes in weight or appetite;  · sudden weakness or ill feeling, fever, chills, sore throat, mouth sores, red or swollen gums, trouble swallowing;  · rash, blisters, oozing, or severe pain in the palms of your hands or the soles of your feet;  · high levels of serotonin in the body --agitation, hallucinations, fever, fast heart rate, overactive reflexes, nausea, vomiting, diarrhea, loss of coordination, fainting;  · low levels of sodium in the body --headache, confusion, slurred speech, severe weakness, vomiting, loss of coordination, feeling unsteady; or  · severe nervous system reaction --very stiff (rigid) muscles, high fever, sweating, confusion, fast or uneven heartbeats, tremors, feeling like you might pass out. Common side effects include:  · drowsiness, dizziness;  · strange dreams;  · vision changes;  · dry mouth;  · constipation;  · increased appetite; or  · weight gain. This is not a complete list of side effects and others may occur. Call your doctor for medical advice about side effects. You may report side effects to FDA at 8-387-FDA-4889. What other drugs will affect mirtazapine? Taking this medicine with other drugs that make you sleepy can worsen this effect. Ask your doctor before taking mirtazepine with a sleeping pill, narcotic pain medicine, muscle relaxer, or medicine for anxiety, depression, or seizures. Tell your doctor about all medicines you use, and those you start or stop using during your treatment with mirtazepine, especially:  · cimetidine (Tagamet);  · diazepam (Valium);  · ketoconazole;  · Barbi's wort;  · tramadol;  · tryptophan (sometimes called L-tryptophan);  · medicine to treat mood disorders, thought disorders, or mental illness --such as lithium, other antidepressants, or antipsychotics;  · migraine headache medicine --sumatriptan, zolmitriptan, and others; or  · seizure medicine --carbamazepine, phenytoin.   Other drugs may interact with mirtazepine, including prescription and over-the-counter medicines, vitamins, and herbal products. Tell each of your health care providers about all medicines you use now and any medicine you start or stop using. Where can I get more information? Your pharmacist can provide more information about mirtazapine. Remember, keep this and all other medicines out of the reach of children, never share your medicines with others, and use this medication only for the indication prescribed. Every effort has been made to ensure that the information provided by [address] is accurate, up-to-date, and complete, but no guarantee is made to that effect. Drug information contained herein may be time sensitive. [facility] information has been compiled for use by healthcare practitioners and consumers in the [place] and therefore [facility] does not warrant that uses outside of the [place] are appropriate, unless specifically indicated otherwise. [facility]'s drug information does not endorse drugs, diagnose patients or recommend therapy. [facility] drug information is an informational resource designed to assist licensed healthcare practitioners in caring for their patients and/or to serve consumers viewing this service as a supplement to, and not a substitute for, the expertise, skill, knowledge and judgment of healthcare practitioners. The absence of a warning for a given drug or drug combination in no way should be construed to indicate that the drug or drug combination is safe, effective or appropriate for any given patient. [facility] does not assume any responsibility for any aspect of healthcare administered with the aid of information [facility] provides. The information contained herein is not intended to cover all possible uses, directions, precautions, warnings, drug interactions, allergic reactions, or adverse effects.  If you have questions about the drugs you are taking, check with your doctor, nurse or

## 2019-07-30 NOTE — PROGRESS NOTES
labs from July rev'd    Assessment / Plan:      1. Sleep disorder    2. S/P ablation of atrial fibrillation    3. Breast cancer screening    4.  Medication management            Plan   Get mammograms  See Darinel Pruett to help with sleep  Same ambien and celxa for now  Consider Remeroan at   RTC 3 mo  Orders Placed This Encounter   Procedures    ABBEY Screening Bilateral    Ambulatory referral to Psychology

## 2019-08-20 ENCOUNTER — HOSPITAL ENCOUNTER (OUTPATIENT)
Dept: WOMENS IMAGING | Age: 79
Discharge: HOME | End: 2019-08-20
Payer: MEDICARE

## 2019-08-20 DIAGNOSIS — Z12.31 SCREENING MAMMOGRAM, ENCOUNTER FOR: ICD-10-CM

## 2019-08-20 PROCEDURE — 77067 SCR MAMMO BI INCL CAD: CPT

## 2019-08-28 ENCOUNTER — OFFICE VISIT (OUTPATIENT)
Dept: PSYCHOLOGY | Age: 79
End: 2019-08-28
Payer: MEDICARE

## 2019-08-28 DIAGNOSIS — F41.9 ANXIETY: Primary | ICD-10-CM

## 2019-08-28 PROCEDURE — 90791 PSYCH DIAGNOSTIC EVALUATION: CPT | Performed by: PSYCHOLOGIST

## 2019-08-28 ASSESSMENT — PATIENT HEALTH QUESTIONNAIRE - PHQ9
9. THOUGHTS THAT YOU WOULD BE BETTER OFF DEAD, OR OF HURTING YOURSELF: 0
SUM OF ALL RESPONSES TO PHQ9 QUESTIONS 1 & 2: 0
1. LITTLE INTEREST OR PLEASURE IN DOING THINGS: 0
4. FEELING TIRED OR HAVING LITTLE ENERGY: 0
3. TROUBLE FALLING OR STAYING ASLEEP: 3
10. IF YOU CHECKED OFF ANY PROBLEMS, HOW DIFFICULT HAVE THESE PROBLEMS MADE IT FOR YOU TO DO YOUR WORK, TAKE CARE OF THINGS AT HOME, OR GET ALONG WITH OTHER PEOPLE: 0
SUM OF ALL RESPONSES TO PHQ QUESTIONS 1-9: 3
5. POOR APPETITE OR OVEREATING: 0
6. FEELING BAD ABOUT YOURSELF - OR THAT YOU ARE A FAILURE OR HAVE LET YOURSELF OR YOUR FAMILY DOWN: 0
SUM OF ALL RESPONSES TO PHQ QUESTIONS 1-9: 3
8. MOVING OR SPEAKING SO SLOWLY THAT OTHER PEOPLE COULD HAVE NOTICED. OR THE OPPOSITE, BEING SO FIGETY OR RESTLESS THAT YOU HAVE BEEN MOVING AROUND A LOT MORE THAN USUAL: 0
2. FEELING DOWN, DEPRESSED OR HOPELESS: 0
7. TROUBLE CONCENTRATING ON THINGS, SUCH AS READING THE NEWSPAPER OR WATCHING TELEVISION: 0

## 2019-08-29 RX ORDER — DILTIAZEM HYDROCHLORIDE 120 MG/1
CAPSULE, EXTENDED RELEASE ORAL
Qty: 90 CAPSULE | Refills: 4 | OUTPATIENT
Start: 2019-08-29

## 2019-10-17 DIAGNOSIS — F51.01 PRIMARY INSOMNIA: Primary | ICD-10-CM

## 2019-10-17 RX ORDER — ZOLPIDEM TARTRATE 5 MG/1
TABLET ORAL
Qty: 30 TABLET | Refills: 2 | Status: SHIPPED | OUTPATIENT
Start: 2019-10-17 | End: 2019-11-04 | Stop reason: SDUPTHER

## 2019-10-30 ENCOUNTER — OFFICE VISIT (OUTPATIENT)
Dept: CARDIOLOGY CLINIC | Age: 79
End: 2019-10-30
Payer: MEDICARE

## 2019-10-30 VITALS
DIASTOLIC BLOOD PRESSURE: 80 MMHG | WEIGHT: 165.2 LBS | HEART RATE: 62 BPM | BODY MASS INDEX: 29.27 KG/M2 | SYSTOLIC BLOOD PRESSURE: 126 MMHG | HEIGHT: 63 IN

## 2019-10-30 DIAGNOSIS — I48.0 PAROXYSMAL ATRIAL FIBRILLATION (HCC): ICD-10-CM

## 2019-10-30 DIAGNOSIS — Z86.79 S/P ABLATION OF ATRIAL FLUTTER: ICD-10-CM

## 2019-10-30 DIAGNOSIS — Z98.890 S/P ABLATION OF ATRIAL FLUTTER: ICD-10-CM

## 2019-10-30 PROCEDURE — 99213 OFFICE O/P EST LOW 20 MIN: CPT | Performed by: NURSE PRACTITIONER

## 2019-11-12 ENCOUNTER — OFFICE VISIT (OUTPATIENT)
Dept: INTERNAL MEDICINE CLINIC | Age: 79
End: 2019-11-12
Payer: MEDICARE

## 2019-11-12 VITALS
HEART RATE: 67 BPM | RESPIRATION RATE: 14 BRPM | SYSTOLIC BLOOD PRESSURE: 118 MMHG | OXYGEN SATURATION: 97 % | DIASTOLIC BLOOD PRESSURE: 72 MMHG

## 2019-11-12 DIAGNOSIS — E03.9 HYPOTHYROIDISM, UNSPECIFIED TYPE: Primary | ICD-10-CM

## 2019-11-12 DIAGNOSIS — Z79.01 CHRONIC ANTICOAGULATION: ICD-10-CM

## 2019-11-12 DIAGNOSIS — Z86.79 S/P ABLATION OF ATRIAL FIBRILLATION: ICD-10-CM

## 2019-11-12 DIAGNOSIS — Z79.899 MEDICATION MANAGEMENT: ICD-10-CM

## 2019-11-12 DIAGNOSIS — D12.6 ADENOMATOUS POLYP OF COLON, UNSPECIFIED PART OF COLON: ICD-10-CM

## 2019-11-12 DIAGNOSIS — Z98.890 S/P ABLATION OF ATRIAL FIBRILLATION: ICD-10-CM

## 2019-11-12 DIAGNOSIS — F41.8 DEPRESSION WITH ANXIETY: ICD-10-CM

## 2019-11-12 PROCEDURE — 99213 OFFICE O/P EST LOW 20 MIN: CPT | Performed by: INTERNAL MEDICINE

## 2019-12-02 RX ORDER — LEVOTHYROXINE SODIUM 88 UG/1
TABLET ORAL
Qty: 90 TABLET | Refills: 4 | Status: SHIPPED | OUTPATIENT
Start: 2019-12-02 | End: 2020-08-18 | Stop reason: SDUPTHER

## 2019-12-05 RX ORDER — CITALOPRAM 20 MG/1
TABLET ORAL
Qty: 90 TABLET | Refills: 4 | Status: SHIPPED | OUTPATIENT
Start: 2019-12-05 | End: 2020-02-14 | Stop reason: DRUGHIGH

## 2020-01-06 NOTE — TELEPHONE ENCOUNTER
90 days has appt in April 2020  Can we call in a short term supply to  [address] will take a little time   To receive

## 2020-01-07 RX ORDER — DILTIAZEM HYDROCHLORIDE 120 MG/1
120 CAPSULE, COATED, EXTENDED RELEASE ORAL DAILY
Qty: 90 CAPSULE | Refills: 1 | Status: SHIPPED | OUTPATIENT
Start: 2020-01-07 | End: 2020-07-06

## 2020-01-17 RX ORDER — PANTOPRAZOLE SODIUM 40 MG/1
40 TABLET, DELAYED RELEASE ORAL DAILY
Qty: 90 TABLET | Refills: 3 | Status: SHIPPED | OUTPATIENT
Start: 2020-01-17 | End: 2020-08-18 | Stop reason: SDUPTHER

## 2020-02-14 ENCOUNTER — OFFICE VISIT (OUTPATIENT)
Dept: INTERNAL MEDICINE CLINIC | Age: 80
End: 2020-02-14
Payer: MEDICARE

## 2020-02-14 VITALS
RESPIRATION RATE: 16 BRPM | DIASTOLIC BLOOD PRESSURE: 72 MMHG | OXYGEN SATURATION: 96 % | HEART RATE: 69 BPM | SYSTOLIC BLOOD PRESSURE: 110 MMHG

## 2020-02-14 PROCEDURE — 99214 OFFICE O/P EST MOD 30 MIN: CPT | Performed by: INTERNAL MEDICINE

## 2020-02-14 RX ORDER — CITALOPRAM 20 MG/1
TABLET ORAL
COMMUNITY
End: 2020-09-01 | Stop reason: SDUPTHER

## 2020-02-14 RX ORDER — ZOLPIDEM TARTRATE 5 MG/1
TABLET ORAL
Qty: 30 TABLET | Refills: 2 | Status: SHIPPED | OUTPATIENT
Start: 2020-02-14 | End: 2020-04-28 | Stop reason: SDUPTHER

## 2020-02-14 NOTE — PROGRESS NOTES
Take 1 tablet by mouth daily 90 tablet 3    Magnesium Oxide 250 MG TABS Take 1 tablet by mouth daily 30 tablet 5    albuterol sulfate HFA (PROVENTIL HFA) 108 (90 Base) MCG/ACT inhaler Inhale 2 puffs into the lungs every 6 hours as needed for Wheezing 1 Inhaler 3    Multiple Vitamins-Iron (MULTIVITAMIN/IRON PO) Take 1 tablet by mouth daily.  gabapentin (NEURONTIN) 300 MG capsule TAKE 1 CAPSULE NIGHTLY 90 capsule 0     No current facility-administered medications for this visit. Past Medical History:   Diagnosis Date    Adenomatous colon polyp 11/2014    Atrial fibrillation with RVR (HCC) 03/18/2018    Chronic anticoagulation 03/2018    A Fib; Xarelto    Chronic radicular lumbar pain 9/2013    Left; Addie Dailey; [place]    Depression with anxiety 2004    Eczema     History of Holter monitoring 01/11/2019    Blocked APCS    Hypothyroidism 07/2012    Insomnia     Osteoarthritis of thumb     base of thumbs bilateral    S/P ablation of atrial fibrillation 08/2018    S/P knee replacement     bilateral        Social History     Tobacco Use    Smoking status: Never Smoker    Smokeless tobacco: Never Used   Substance Use Topics    Alcohol use: No     Comment: No caffeine        ROS: The patient has had no headache, sore throat, fever or chills, cough, dyspnea, chest pain, nausea, vomiting or diarrhea, or edema. Objective:      /72   Pulse 69   Resp 16   SpO2 96%      Physical Exam  Vitals signs reviewed. Constitutional:       General: She is not in acute distress. Appearance: She is well-developed. HENT:      Head: Normocephalic and atraumatic. Mouth/Throat:      Mouth: Mucous membranes are moist.      Pharynx: Oropharynx is clear. Eyes:      General: No scleral icterus. Conjunctiva/sclera: Conjunctivae normal.   Neck:      Musculoskeletal: Neck supple. No muscular tenderness. Cardiovascular:      Rate and Rhythm: Normal rate and regular rhythm. Heart sounds: No murmur. Pulmonary:      Effort: Pulmonary effort is normal. No respiratory distress. Breath sounds: No wheezing or rales. Abdominal:      General: There is no distension. Palpations: Abdomen is soft. Tenderness: There is no abdominal tenderness. Musculoskeletal: Normal range of motion. Right lower leg: No edema. Left lower leg: No edema. Lymphadenopathy:      Cervical: No cervical adenopathy. Skin:     General: Skin is warm and dry. Findings: No rash. Neurological:      General: No focal deficit present. Mental Status: She is alert and oriented to person, place, and time. Coordination: Coordination normal.   Psychiatric:         Behavior: Behavior normal.       Last lab 2/2019 rev'd     Assessment / Plan:      1. Paroxysmal atrial fibrillation (HCC)    2. Primary insomnia    3. Medication management    4. S/P ablation of atrial fibrillation    5. Chronic anticoagulation    6. Mixed hyperlipidemia    7.  Hypothyroidism, unspecified type            Plan   Get lab in March  Orders Placed This Encounter   Procedures    CBC Auto Differential    Comprehensive Metabolic Panel    Lipid Panel    TSH without Reflex     Increase celexa to 40 mg /d  Call If no improement in 3 wk  RTC 6 wk    Discussed that chronic use of ambien may be a factor in her irritability  Contract signed today

## 2020-03-27 RX ORDER — GABAPENTIN 300 MG/1
300 CAPSULE ORAL NIGHTLY
Qty: 90 CAPSULE | Refills: 1 | Status: SHIPPED | OUTPATIENT
Start: 2020-03-27 | End: 2020-10-04 | Stop reason: SDUPTHER

## 2020-04-27 NOTE — TELEPHONE ENCOUNTER
Annabel Neighbours called wanting a refill of her Ambien and would also like to renew her Handicap Placard.

## 2020-04-28 RX ORDER — ZOLPIDEM TARTRATE 5 MG/1
TABLET ORAL
Qty: 30 TABLET | Refills: 2 | Status: SHIPPED | OUTPATIENT
Start: 2020-04-28 | End: 2020-05-17 | Stop reason: SDUPTHER

## 2020-04-29 ENCOUNTER — TELEMEDICINE (OUTPATIENT)
Dept: CARDIOLOGY CLINIC | Age: 80
End: 2020-04-29
Payer: MEDICARE

## 2020-04-29 VITALS
HEART RATE: 81 BPM | SYSTOLIC BLOOD PRESSURE: 160 MMHG | WEIGHT: 155 LBS | HEIGHT: 63 IN | DIASTOLIC BLOOD PRESSURE: 90 MMHG | BODY MASS INDEX: 27.46 KG/M2

## 2020-04-29 PROCEDURE — 99213 OFFICE O/P EST LOW 20 MIN: CPT | Performed by: INTERNAL MEDICINE

## 2020-04-29 NOTE — LETTER
Tracy Cha [address]  1940  [id number]    Have you had any Chest Pain - No      Have you had any Shortness of Breath - No      Have you had any dizziness - No    Have you had any palpitations - No      Do you have any edema - No    Do you have a surgery or procedure scheduled in the near future - No

## 2020-04-29 NOTE — PROGRESS NOTES
heartburn  Genito-Urinary: Negative for urinary frequency/urgency  Musculoskeletal: Negative for muscle pain, muscular weakness, negative for pain in arm and leg or swelling in foot and leg  Neurological: Negative for dizziness, headaches, memory loss, numbness/tingling, visual changes, syncope  Dermatological: Negative for rash    Objective:  BP (!) 160/90   Pulse 81   Ht 5' 3\" (1.6 m)   Wt 155 lb (70.3 kg)   BMI 27.46 kg/m²   Wt Readings from Last 3 Encounters:   04/29/20 155 lb (70.3 kg)   10/30/19 165 lb 3.2 oz (74.9 kg)   04/15/19 153 lb (69.4 kg)     Body mass index is 27.46 kg/m². GENERAL - Alert, oriented, pleasant, in no apparent distress. EYES: No jaundice, no conjunctival pallor. SKIN: It is warm & dry. No rashes. No Echhymosis    HEENT - No clinically significant abnormalities seen. Neck - No jugular venous distention noted.  .   Neurologic - AxO X3    Lab Review   No results found for: CKTOTAL, CKMB, CKMBINDEX, TROPONINT  BNP:  No results found for: BNP  PT/INR:    Lab Results   Component Value Date    INR 1.23 08/17/2018     No results found for: LABA1C  Lab Results   Component Value Date    WBC 4.2 07/18/2019    HCT 40.2 07/18/2019    MCV 97.3 07/18/2019     07/18/2019     Lab Results   Component Value Date    CHOL 180 07/18/2019    TRIG 65 07/18/2019    HDL 72 07/18/2019    LDLCALC 91 07/19/2016    LDLDIRECT 114 (H) 07/18/2019     Lab Results   Component Value Date    ALT 9 (L) 07/18/2019    AST 15 07/18/2019     BMP:    Lab Results   Component Value Date     07/18/2019    K 4.3 07/18/2019    K 4.8 03/19/2018     07/18/2019    CO2 28 07/18/2019    BUN 18 07/18/2019    CREATININE 0.7 07/18/2019     CMP:   Lab Results   Component Value Date     07/18/2019    K 4.3 07/18/2019    K 4.8 03/19/2018     07/18/2019    CO2 28 07/18/2019    BUN 18 07/18/2019    PROT 6.6 07/18/2019    PROT 6.6 07/06/2012     TSH:    Lab Results   Component Value Date    TSH 1.12 07/19/2016 [facility] 1.210 07/18/2019       s. Impression:    No diagnosis found. Patient Active Problem List   Diagnosis Code    Eczema L30.9    Chronic radicular lumbar pain M54.16, G89.29    Chronic anticoagulation Z79.01    Paroxysmal atrial fibrillation (HCC) I48.0    Medication management Z79.899    S/P ablation of atrial flutter Z98.890, Z86.79    S/P ablation of atrial fibrillation Z98.890, Z86.79    Depression with anxiety F41.8    Hypothyroidism E03.9       Assessment & Plan:    - Atrial fibrillation, pt is  compliant with meds. Patient does not have symptoms from atrial fibrillation    - hypothyroidism  On synthroid    - BP elevated  No history will recheck  149/87       I confirm that this visit was completed in a telehealth setting ,using synchronous audiovisual technology for real time patient interaction . The patient identity with name and date of birth was confirmed . This evaluation of patient was done by telehealth in the setting of HCKBY-31 Public health emergency , which precluded assurance of safe in person visit at the time of service. The patient consented to and accepts potential risks associated with telemedical evaluation and care was taken to assess liliana presence of any medical issues that would be more  appropriate for expedited in -person care. Pursuant to the emergency declaration under the [facility], [facility] waiver authority and the [employer] and [employer] Act, this Virtual  Visit was conducted, with patient's consent, to reduce the patient's risk of exposure to COVID-19 and provide continuity of care for an established patient. Services were provided through a video synchronous discussion virtually to substitute for in-person clinic visit.          Payal Jarvis MD    [address] [facility]

## 2020-05-15 ENCOUNTER — TELEPHONE (OUTPATIENT)
Dept: INTERNAL MEDICINE CLINIC | Age: 80
End: 2020-05-15

## 2020-05-15 ENCOUNTER — PATIENT MESSAGE (OUTPATIENT)
Dept: INTERNAL MEDICINE CLINIC | Age: 80
End: 2020-05-15

## 2020-05-17 RX ORDER — ZOLPIDEM TARTRATE 5 MG/1
TABLET ORAL
Qty: 30 TABLET | Refills: 2 | Status: SHIPPED | OUTPATIENT
Start: 2020-05-17 | End: 2020-08-13

## 2020-07-06 RX ORDER — DILTIAZEM HYDROCHLORIDE 120 MG/1
120 CAPSULE, COATED, EXTENDED RELEASE ORAL DAILY
Qty: 90 CAPSULE | Refills: 3 | Status: SHIPPED | OUTPATIENT
Start: 2020-07-06 | End: 2020-10-04 | Stop reason: SDUPTHER

## 2020-07-27 RX ORDER — RIVAROXABAN 20 MG/1
TABLET, FILM COATED ORAL
Qty: 90 TABLET | Refills: 3 | Status: SHIPPED | OUTPATIENT
Start: 2020-07-27 | End: 2020-08-18 | Stop reason: SDUPTHER

## 2020-08-18 ENCOUNTER — OFFICE VISIT (OUTPATIENT)
Dept: INTERNAL MEDICINE CLINIC | Age: 80
End: 2020-08-18
Payer: MEDICARE

## 2020-08-18 VITALS
HEART RATE: 67 BPM | OXYGEN SATURATION: 97 % | HEIGHT: 61 IN | BODY MASS INDEX: 33.23 KG/M2 | WEIGHT: 176 LBS | TEMPERATURE: 97.1 F | DIASTOLIC BLOOD PRESSURE: 70 MMHG | SYSTOLIC BLOOD PRESSURE: 130 MMHG

## 2020-08-18 PROBLEM — J45.20 MILD INTERMITTENT ASTHMA: Status: ACTIVE | Noted: 2020-08-18

## 2020-08-18 PROCEDURE — 99214 OFFICE O/P EST MOD 30 MIN: CPT | Performed by: FAMILY MEDICINE

## 2020-08-18 PROCEDURE — 3288F FALL RISK ASSESSMENT DOCD: CPT | Performed by: FAMILY MEDICINE

## 2020-08-18 RX ORDER — ZOLPIDEM TARTRATE 5 MG/1
5 TABLET ORAL NIGHTLY PRN
Qty: 30 TABLET | Refills: 3 | Status: SHIPPED | OUTPATIENT
Start: 2020-08-18 | End: 2021-01-11 | Stop reason: SDUPTHER

## 2020-08-18 RX ORDER — PANTOPRAZOLE SODIUM 40 MG/1
40 TABLET, DELAYED RELEASE ORAL DAILY
Qty: 90 TABLET | Refills: 3 | Status: SHIPPED | OUTPATIENT
Start: 2020-08-18 | End: 2021-02-26

## 2020-08-18 RX ORDER — ATORVASTATIN CALCIUM 20 MG/1
20 TABLET, FILM COATED ORAL DAILY
Qty: 90 TABLET | Refills: 3 | Status: SHIPPED | OUTPATIENT
Start: 2020-08-18 | End: 2021-08-10

## 2020-08-18 RX ORDER — ZOLPIDEM TARTRATE 5 MG/1
5 TABLET ORAL NIGHTLY PRN
COMMUNITY
End: 2020-08-18 | Stop reason: SDUPTHER

## 2020-08-18 RX ORDER — LEVOTHYROXINE SODIUM 88 UG/1
TABLET ORAL
Qty: 90 TABLET | Refills: 3 | Status: SHIPPED | OUTPATIENT
Start: 2020-08-18 | End: 2021-02-02 | Stop reason: SDUPTHER

## 2020-08-18 ASSESSMENT — ENCOUNTER SYMPTOMS
VOMITING: 0
COLOR CHANGE: 0
DIARRHEA: 0
SHORTNESS OF BREATH: 0
CONSTIPATION: 0
CHEST TIGHTNESS: 0
ABDOMINAL PAIN: 1
SORE THROAT: 0

## 2020-08-18 NOTE — PROGRESS NOTES
Subjective:      Chief Complaint   Patient presents with   Moe Boucher Doctor     previous DR. Reagan Mendez       HPI:  Lakeisha Razo is a 79 y.o. female who presents today to establish care with new provider and for management of chronic medical conditions as below. Abdominal discomfort:  Has been happening intermittently for the past few months. Happens once or twice a week, no trigger that she can identify. Pain is mild, usually mid abdomen, happens in the evening but resolves on its own by the next morning. Some associated nausea, no vomiting or diarrhea, no weight loss. Normal bowel movements. Last colonoscopy about 5 years ago, states it was normal.  States her  started making fruit juice every night a few months ago (just prior to symptom onset) and she has been drinking it every day. No new medications. Asthma:  Uses albuterol about once a week. Insomnia:  Uses ambien half tab at night- takes the other half tab if needed. Has never been tried on anything else. Thinks she started having insomnia issues when she was taking care of her sick parents. The 10-year ASCVD risk score (Peg Roseann., et al., 2013) is: 26%    Values used to calculate the score:      Age: 79 years      Sex: Female      Is Non- : No      Diabetic: No      Tobacco smoker: No      Systolic Blood Pressure: 763 mmHg      Is BP treated: No      HDL Cholesterol: 72 MG/DL      Total Cholesterol: 180 MG/DL        Past Medical History:   Diagnosis Date    Adenomatous colon polyp 11/2014    Atrial fibrillation with RVR (HCC) 03/18/2018    Chronic anticoagulation 03/2018    A Fib; Xarelto    Chronic radicular lumbar pain 9/2013    Left; Juan Diego Solares    Depression with anxiety 2004    Eczema     History of Holter monitoring 01/11/2019    Blocked APCS    Hypothyroidism 07/2012    Insomnia     Osteoarthritis of thumb     base of thumbs bilateral    S/P ablation of atrial fibrillation 2018    S/P knee replacement     bilateral        Past Surgical History:   Procedure Laterality Date    ABLATION OF DYSRHYTHMIC FOCUS  2018    BREAST BIOPSY Bilateral     CATARACT REMOVAL WITH IMPLANT Bilateral 2020     SECTION      X 3    TOTAL KNEE ARTHROPLASTY Right 2017    Shailesh Cook TOTAL KNEE ARTHROPLASTY Left 2018    Dr Nicholas Valente       Social History     Tobacco Use    Smoking status: Never Smoker    Smokeless tobacco: Never Used   Substance Use Topics    Alcohol use: No     Comment: No caffeine        Review of Systems   Constitutional: Negative for activity change, appetite change, chills, fever and unexpected weight change. HENT: Negative for congestion and sore throat. Respiratory: Negative for chest tightness and shortness of breath. Cardiovascular: Negative for chest pain and palpitations. Gastrointestinal: Positive for abdominal pain. Negative for constipation, diarrhea and vomiting. Genitourinary: Negative for dysuria. Skin: Negative for color change. Neurological: Negative for dizziness and light-headedness. Psychiatric/Behavioral: Negative for dysphoric mood. The patient is not nervous/anxious. Prior to Visit Medications    Medication Sig Taking? Authorizing Provider   zolpidem (AMBIEN) 5 MG tablet Take 5 mg by mouth nightly as needed for Sleep. Yes Historical Provider, MD   XARELTO 20 MG TABS tablet TAKE 1 TABLET DAILY Yes Wilian Rehman MD   dilTIAZem (CARDIZEM CD) 120 MG extended release capsule Take 1 capsule by mouth daily Yes Victorina Velez MD   Handicap Placard MISC by Does not apply route Good for 1 year Yes Wilian Rehman MD   gabapentin (NEURONTIN) 300 MG capsule Take 1 capsule by mouth nightly for 180 days.  Yes Rm Painter MD   citalopram (CELEXA) 20 MG tablet Take 2 tablets daily Yes Historical Provider, MD   pantoprazole (PROTONIX) 40 MG tablet Take 1 tablet by mouth daily Yes Storm Covarrubias hyperlipidemia  10 year ASCVD 26%. Patient open to starting medication, will start lipitor- risks/side effects discussed. Will monitor LFTs. Encouraged dietary modifications/weight loss. - CBC Auto Differential; Future  - Lipid Panel; Future  - Comprehensive Metabolic Panel; Future  - Hemoglobin A1C; Future  - atorvastatin (LIPITOR) 20 MG tablet; Take 1 tablet by mouth daily  Dispense: 90 tablet; Refill: 3    2. Primary insomnia  Chronic, usually uses half tab at night. Reviewed risks/side effects and to try minimizing use as much as possible. - zolpidem (AMBIEN) 5 MG tablet; Take 1 tablet by mouth nightly as needed for Sleep for up to 30 doses. Dispense: 30 tablet; Refill: 3    3. Mild intermittent asthma, unspecified whether complicated  Well controlled, continue albuterol as needed. 4. Hypothyroidism, unspecified type  Last TSH wnl, patient asymptomatic. Will monitor labs, continue current dose of synthroid. - levothyroxine (SYNTHROID) 88 MCG tablet; TAKE 1 TABLET DAILY  Dispense: 90 tablet; Refill: 3  - TSH without Reflex; Future    5. Elevated glucose  Will screen for DM.  - Hemoglobin A1C; Future    6. Generalized abdominal pain  Suspect due to drinking fruit juice every night- advised to try stopping for a few weeks to see if that resolves symptoms. Contact clinic if symptoms do not improve. Patient voiced understanding and agreement with plan. All questions/concerns were addressed, risks/side effects of medications were reviewed. Return precautions and after visit summary were provided. Return in about 6 months (around 2/18/2021). or earlier as needed.       Byron Mortimer, MD

## 2020-09-01 RX ORDER — CITALOPRAM 20 MG/1
20 TABLET ORAL DAILY
Qty: 10 TABLET | Refills: 0 | Status: SHIPPED | OUTPATIENT
Start: 2020-09-01 | End: 2020-10-04 | Stop reason: SDUPTHER

## 2020-10-05 RX ORDER — CITALOPRAM 20 MG/1
20 TABLET ORAL DAILY
Qty: 10 TABLET | Refills: 0 | Status: SHIPPED | OUTPATIENT
Start: 2020-10-05 | End: 2020-10-15

## 2020-10-05 RX ORDER — GABAPENTIN 300 MG/1
300 CAPSULE ORAL NIGHTLY
Qty: 90 CAPSULE | Refills: 1 | Status: SHIPPED | OUTPATIENT
Start: 2020-10-05 | End: 2021-02-18 | Stop reason: SDUPTHER

## 2020-10-05 RX ORDER — DILTIAZEM HYDROCHLORIDE 120 MG/1
120 CAPSULE, COATED, EXTENDED RELEASE ORAL DAILY
Qty: 90 CAPSULE | Refills: 3 | Status: SHIPPED | OUTPATIENT
Start: 2020-10-05 | End: 2021-10-20

## 2020-10-12 RX ORDER — DILTIAZEM HYDROCHLORIDE 120 MG/1
120 CAPSULE, COATED, EXTENDED RELEASE ORAL DAILY
Qty: 90 CAPSULE | Refills: 3 | OUTPATIENT
Start: 2020-10-12

## 2020-10-14 NOTE — TELEPHONE ENCOUNTER
[facility] called with a question [phone] Ref# [id number]  Please resend the celexa  prescription the directions and amount need to be clarified. Cancelled the previous prescription.

## 2020-10-15 RX ORDER — CITALOPRAM 20 MG/1
20 TABLET ORAL DAILY
Qty: 90 TABLET | Refills: 3 | Status: CANCELLED | OUTPATIENT
Start: 2020-10-15

## 2020-10-15 RX ORDER — CITALOPRAM 40 MG/1
40 TABLET ORAL DAILY
COMMUNITY
End: 2020-10-15

## 2020-10-15 RX ORDER — CITALOPRAM 20 MG/1
40 TABLET ORAL DAILY
COMMUNITY
End: 2020-10-15

## 2020-10-15 RX ORDER — CITALOPRAM 40 MG/1
40 TABLET ORAL DAILY
Qty: 90 TABLET | Refills: 3 | Status: SHIPPED | OUTPATIENT
Start: 2020-10-15 | End: 2021-09-23

## 2020-10-15 RX ORDER — CITALOPRAM 20 MG/1
40 TABLET ORAL DAILY
Qty: 180 TABLET | Refills: 2 | Status: CANCELLED | OUTPATIENT
Start: 2020-10-15

## 2020-10-15 NOTE — TELEPHONE ENCOUNTER
Please confirm with patient what dose she is taking- last RX states \"Take 1 tab by mouth take 2 tabs daily\"  Is she taking 1 or 2 tabs daily? Thanks.

## 2020-10-15 NOTE — TELEPHONE ENCOUNTER
I've filled 40mg tabs instead of 20mg-- please instruct patient to ONLY TAKE 1 TAB DAILY INSTEAD OF 2 when she gets her new RX in the mail. Thanks.

## 2020-11-03 ENCOUNTER — OFFICE VISIT (OUTPATIENT)
Dept: CARDIOLOGY CLINIC | Age: 80
End: 2020-11-03
Payer: MEDICARE

## 2020-11-03 VITALS
BODY MASS INDEX: 27.86 KG/M2 | HEIGHT: 62 IN | SYSTOLIC BLOOD PRESSURE: 90 MMHG | HEART RATE: 72 BPM | WEIGHT: 151.4 LBS | DIASTOLIC BLOOD PRESSURE: 66 MMHG

## 2020-11-03 PROCEDURE — 93000 ELECTROCARDIOGRAM COMPLETE: CPT | Performed by: INTERNAL MEDICINE

## 2020-11-03 PROCEDURE — 99214 OFFICE O/P EST MOD 30 MIN: CPT | Performed by: INTERNAL MEDICINE

## 2020-11-03 NOTE — LETTER
Jillian Tidwell [address]  1940  [id number]    Have you had any Chest Pain - No       Have you had any Shortness of Breath - Yes  If Yes - When on exertion    Have you had any dizziness - No       Have you had any palpitations - No       Is the patient on any of the following medications - NONE  If Yes DO EKG    Do you have any edema - swelling in NONE    If Yes - CHECK TO SEE IF THE EDEMA IS PITTING      Do you have a surgery or procedure scheduled in the near future - No  If Yes- DO EKG    Ask patient if they want to sign up for MyChart if they are not already signed up    Check to see if we have an E-MAIL on file for the patient    Check medication list thoroughly!!!  BE SURE TO ASK PATIENT IF THEY NEED MEDICATION REFILLS    At check out add to every patient's \"wrap up\" the following dot phrase AFTERHOURSEDUCATION and ensure we explain this to our patients

## 2020-11-03 NOTE — PROGRESS NOTES
CARDIOLOGY NOTE      11/3/2020    RE: Mateo Grace  (1940)                               TO:  Dr. Emily Serrano MD            Sarwat Swain is a 79 y.o. female who was seen today for management of  A fib                                    HPI:                   The patient does have cardiac complaints of mild exertional recurrent SOB, has no CP  Patient also seen  for    - Atrial fibrillation, pt is  compliant with meds. Patient does not have symptoms from atrial fibrillation  - Hyperlipidimea, importance of hyperlipidimea discussed with pt. Mateo Grace has the following history recorded in care path:  Patient Active Problem List    Diagnosis Date Noted    S/P ablation of atrial fibrillation      Priority: High    S/P ablation of atrial flutter 08/20/2018     Priority: High    Paroxysmal atrial fibrillation ([address] 04/18/2018     Priority: Low    Chronic anticoagulation 03/01/2018     Priority: Low    Chronic radicular lumbar pain 09/01/2013     Priority: Low    Eczema      Priority: Low    Mild intermittent asthma 08/18/2020    Medication management 05/22/2018    Hypothyroidism 07/01/2012    Depression with anxiety 01/01/2004     Current Outpatient Medications   Medication Sig Dispense Refill    citalopram (CELEXA) 40 MG tablet Take 1 tablet by mouth daily 90 tablet 3    gabapentin (NEURONTIN) 300 MG capsule Take 1 capsule by mouth nightly for 180 days. 90 capsule 1    dilTIAZem (CARDIZEM CD) 120 MG extended release capsule Take 1 capsule by mouth daily 90 capsule 3    rivaroxaban (XARELTO) 20 MG TABS tablet TAKE 1 TABLET DAILY 90 tablet 3    pantoprazole (PROTONIX) 40 MG tablet Take 1 tablet by mouth daily 90 tablet 3    levothyroxine (SYNTHROID) 88 MCG tablet TAKE 1 TABLET DAILY 90 tablet 3    zolpidem (AMBIEN) 5 MG tablet Take 1 tablet by mouth nightly as needed for Sleep for up to 30 doses.  30 tablet 3    atorvastatin (LIPITOR) 20 MG tablet Take 1 tablet by mouth daily 90 tablet 3    Magnesium Oxide 250 MG TABS Take 1 tablet by mouth daily 30 tablet 5    Handicap Placard MISC by Does not apply route Good for 1 year 1 each 0    albuterol sulfate HFA (PROVENTIL HFA) 108 (90 Base) MCG/ACT inhaler Inhale 2 puffs into the lungs every 6 hours as needed for Wheezing (Patient not taking: Reported on 11/3/2020) 1 Inhaler 3    Multiple Vitamins-Iron (MULTIVITAMIN/IRON PO) Take 1 tablet by mouth daily. No current facility-administered medications for this visit. Allergies: Patient has no known allergies. Past Medical History:   Diagnosis Date    Adenomatous colon polyp 2014    Atrial fibrillation with RVR (HCC) 2018    Chronic anticoagulation 2018    A Fib; Xarelto    Chronic radicular lumbar pain 2013    Left; Nba Nicholaser; Sujatha    Depression with anxiety     Eczema     History of Holter monitoring 2019    Blocked APCS    Hypothyroidism 2012    Insomnia     Osteoarthritis of thumb     base of thumbs bilateral    S/P ablation of atrial fibrillation 2018    S/P knee replacement     bilateral     Past Surgical History:   Procedure Laterality Date    ABLATION OF DYSRHYTHMIC FOCUS  2018    BREAST BIOPSY Bilateral     CATARACT REMOVAL WITH IMPLANT Bilateral      SECTION      X 3    TOTAL KNEE ARTHROPLASTY Right 2017    Raquel Rubalcava TOTAL KNEE ARTHROPLASTY Left 2018    Dr Astrid Mckeon      As reviewed No family history on file. Social History     Tobacco Use    Smoking status: Never Smoker    Smokeless tobacco: Never Used   Substance Use Topics    Alcohol use: No     Comment: No caffeine      Review of Systems:    Constitutional: Negative for diaphoresis and fatigue  Psychological:Negative for anxiety or depression  HENT: Negative for headaches, nasal congestion, sinus pain or vertigo  Eyes: Negative for visual disturbance.    Endocrine: Negative for polydipsia/polyuria  Respiratory: Negative for shortness of breath  Cardiovascular:  SOBGastrointestinal: Negative for abdominal pain or heartburn  Genito-Urinary: Negative for urinary frequency/urgency  Musculoskeletal: Negative for muscle pain, muscular weakness, negative for pain in arm and leg or swelling in foot and leg  Neurological: Negative for dizziness, headaches, memory loss, numbness/tingling, visual changes, syncope  Dermatological: Negative for rash    Objective:    Vitals:    11/03/20 1320   BP: 90/66   Site: Left Upper Arm   Position: Sitting   Cuff Size: Medium Adult   Pulse: 72   Weight: 151 lb 6.4 oz (68.7 kg)   Height: 5' 2\" (1.575 m)     BP 90/66 (Site: Left Upper Arm, Position: Sitting, Cuff Size: Medium Adult)   Pulse 72   Ht 5' 2\" (1.575 m)   Wt 151 lb 6.4 oz (68.7 kg)   BMI 27.69 kg/m²     No flowsheet data found. Wt Readings from Last 3 Encounters:   11/03/20 151 lb 6.4 oz (68.7 kg)   08/18/20 176 lb (79.8 kg)   04/29/20 155 lb (70.3 kg)     Body mass index is 27.69 kg/m². GENERAL - Alert, oriented, pleasant, in no apparent distress. EYES: No jaundice, no conjunctival pallor. SKIN: It is warm & dry. No rashes. No Echhymosis    HEENT - No clinically significant abnormalities seen. Neck - Supple. No jugular venous distention noted. No carotid bruits. Cardiovascular - Normal S1 and S2 without obvious murmur or gallop. Extremities - No cyanosis, clubbing, or significant edema. Pulmonary - No respiratory distress. No wheezes or rales. Abdomen - No masses, tenderness, or organomegaly. Musculoskeletal - No significant edema. No joint deformities. No muscle wasting. Neurologic - Cranial nerves II through XII are grossly intact. There were no gross focal neurologic abnormalities.     Lab Review   No results found for: CKTOTAL, CKMB, CKMBINDEX, TROPONINT  BNP:  No results found for: BNP  PT/INR:    Lab Results   Component Value Date    INR 1.23 08/17/2018     No results found for: LABA1C  Lab Results   Component Value Date    WBC 4.2 07/18/2019    HCT 40.2 07/18/2019    MCV 97.3 07/18/2019     07/18/2019     Lab Results   Component Value Date    CHOL 180 07/18/2019    TRIG 65 07/18/2019    HDL 72 07/18/2019    LDLCALC 91 07/19/2016    LDLDIRECT 114 (H) 07/18/2019     Lab Results   Component Value Date    ALT 9 (L) 07/18/2019    AST 15 07/18/2019     BMP:    Lab Results   Component Value Date     07/18/2019    K 4.3 07/18/2019    K 4.8 03/19/2018     07/18/2019    CO2 28 07/18/2019    BUN 18 07/18/2019    CREATININE 0.7 07/18/2019     CMP:   Lab Results   Component Value Date     07/18/2019    K 4.3 07/18/2019    K 4.8 03/19/2018     07/18/2019    CO2 28 07/18/2019    BUN 18 07/18/2019    PROT 6.6 07/18/2019    PROT 6.6 07/06/2012     TSH:    Lab Results   Component Value Date    TSH 1.12 07/19/2016    [facility] 1.210 07/18/2019         Impression:    1. Paroxysmal atrial fibrillation [facility])       Patient Active Problem List   Diagnosis Code    Eczema L30.9    Chronic radicular lumbar pain M54.16, G89.29    Chronic anticoagulation Z79.01    Paroxysmal atrial fibrillation (HCC) I48.0    Medication management Z79.899    S/P ablation of atrial flutter Z98.890, Z86.79    S/P ablation of atrial fibrillation Z98.890, Z86.79    Depression with anxiety F41.8    Hypothyroidism E03.9    Mild intermittent asthma J45.20       Assessment & Plan:    - SOB, LBBB: echo, ETT    - Atrial fibrillation, pt is  compliant with meds. Patient does not have symptoms from atrial fibrillation  SP ablation    -  LIPID MANAGEMENT:  Importance of lipid levels discussed with patient   and patient was given dietary advice. NCEP- ATP III guidelines reviewed with patient. -   Changes  in medicines made:  No                                    Ever Heath MD    [facility] [place]

## 2020-11-23 ENCOUNTER — PROCEDURE VISIT (OUTPATIENT)
Dept: CARDIOLOGY CLINIC | Age: 80
End: 2020-11-23
Payer: MEDICARE

## 2020-11-23 VITALS
WEIGHT: 151 LBS | HEIGHT: 62 IN | DIASTOLIC BLOOD PRESSURE: 72 MMHG | BODY MASS INDEX: 27.79 KG/M2 | SYSTOLIC BLOOD PRESSURE: 120 MMHG

## 2020-11-23 LAB
LV EF: 58 %
LVEF MODALITY: NORMAL

## 2020-11-23 PROCEDURE — 93306 TTE W/DOPPLER COMPLETE: CPT | Performed by: INTERNAL MEDICINE

## 2020-11-23 PROCEDURE — 93015 CV STRESS TEST SUPVJ I&R: CPT | Performed by: INTERNAL MEDICINE

## 2020-11-24 ENCOUNTER — TELEPHONE (OUTPATIENT)
Dept: CARDIOLOGY CLINIC | Age: 80
End: 2020-11-24

## 2020-11-24 NOTE — TELEPHONE ENCOUNTER
Advised patient of results. Patient voiced understanding. Left ventricular function is normal, EF is estimated at 55-60%. Mild left ventricular hypertrophy. Normal diastolic filling pattern for age. Mildly dilated left atrium. Aneurysmal interatrial septum. Mild mitral, aortic,tricuspid and pulmonic regurgitation is present. RVSP is 37 mmHg. No evidence of pericardial effusion. Normal stress test with baseline IVCD   Completed 4.05 Min and 5.7 METS of exercise with Peak BP of 156/80   Appropriate hemodynamic response to exercise. No significant ST T wave   changes with exercise. EKG portion is negative for ischemia by diagnostic   criteria.

## 2021-01-11 ENCOUNTER — HOSPITAL ENCOUNTER (OUTPATIENT)
Age: 81
Discharge: HOME | End: 2021-01-11
Payer: COMMERCIAL

## 2021-01-11 LAB
ALBUMIN SERPL-MCNC: 3.9 GM/DL (ref 3.4–5)
ALP BLD-CCNC: 100 IU/L (ref 40–128)
ALT SERPL-CCNC: 8 U/L (ref 10–40)
ANION GAP SERPL CALCULATED.3IONS-SCNC: 10 MMOL/L (ref 4–16)
AST SERPL-CCNC: 17 IU/L (ref 15–37)
BASOPHILS ABSOLUTE: 0 K/CU MM
BASOPHILS RELATIVE PERCENT: 0.4 % (ref 0–1)
BILIRUB SERPL-MCNC: 0.6 MG/DL (ref 0–1)
BUN BLDV-MCNC: 16 MG/DL (ref 6–23)
CALCIUM SERPL-MCNC: 8.8 MG/DL (ref 8.3–10.6)
CHLORIDE BLD-SCNC: 103 MMOL/L (ref 99–110)
CHOLESTEROL: 126 MG/DL
CO2: 26 MMOL/L (ref 21–32)
CREAT SERPL-MCNC: 0.9 MG/DL (ref 0.6–1.1)
DIFFERENTIAL TYPE: ABNORMAL
EOSINOPHILS ABSOLUTE: 0.1 K/CU MM
EOSINOPHILS RELATIVE PERCENT: 1.7 % (ref 0–3)
ESTIMATED AVERAGE GLUCOSE: 105 MG/DL
GFR AFRICAN AMERICAN: >60 ML/MIN/1.73M2
GFR NON-AFRICAN AMERICAN: >60 ML/MIN/1.73M2
GLUCOSE BLD-MCNC: 78 MG/DL (ref 70–99)
HBA1C MFR BLD: 5.3 % (ref 4.2–6.3)
HCT VFR BLD CALC: 38.8 % (ref 37–47)
HDLC SERPL-MCNC: 64 MG/DL
HEMOGLOBIN: 12.1 GM/DL (ref 12.5–16)
IMMATURE NEUTROPHIL %: 0.2 % (ref 0–0.43)
LDL CHOLESTEROL DIRECT: 55 MG/DL
LYMPHOCYTES ABSOLUTE: 1.4 K/CU MM
LYMPHOCYTES RELATIVE PERCENT: 30.1 % (ref 24–44)
MCH RBC QN AUTO: 32.5 PG (ref 27–31)
MCHC RBC AUTO-ENTMCNC: 31.2 % (ref 32–36)
MCV RBC AUTO: 104.3 FL (ref 78–100)
MONOCYTES ABSOLUTE: 0.4 K/CU MM
MONOCYTES RELATIVE PERCENT: 7.5 % (ref 0–4)
NUCLEATED RBC %: 0 %
PDW BLD-RTO: 13.5 % (ref 11.7–14.9)
PLATELET # BLD: 203 K/CU MM (ref 140–440)
PMV BLD AUTO: 10.2 FL (ref 7.5–11.1)
POTASSIUM SERPL-SCNC: 5.1 MMOL/L (ref 3.5–5.1)
RBC # BLD: 3.72 M/CU MM (ref 4.2–5.4)
SEGMENTED NEUTROPHILS ABSOLUTE COUNT: 2.8 K/CU MM
SEGMENTED NEUTROPHILS RELATIVE PERCENT: 60.1 % (ref 36–66)
SODIUM BLD-SCNC: 139 MMOL/L (ref 135–145)
TOTAL IMMATURE NEUTOROPHIL: 0.01 K/CU MM
TOTAL NUCLEATED RBC: 0 K/CU MM
TOTAL PROTEIN: 6.3 GM/DL (ref 6.4–8.2)
TRIGL SERPL-MCNC: 43 MG/DL
TSH HIGH SENSITIVITY: 1.82 UIU/ML (ref 0.27–4.2)
WBC # BLD: 4.7 K/CU MM (ref 4–10.5)

## 2021-01-11 PROCEDURE — 84443 ASSAY THYROID STIM HORMONE: CPT

## 2021-01-11 PROCEDURE — 36415 COLL VENOUS BLD VENIPUNCTURE: CPT

## 2021-01-11 PROCEDURE — 83036 HEMOGLOBIN GLYCOSYLATED A1C: CPT

## 2021-01-11 PROCEDURE — 80053 COMPREHEN METABOLIC PANEL: CPT

## 2021-01-11 PROCEDURE — 83721 ASSAY OF BLOOD LIPOPROTEIN: CPT

## 2021-01-11 PROCEDURE — 85025 COMPLETE CBC W/AUTO DIFF WBC: CPT

## 2021-01-11 PROCEDURE — 80061 LIPID PANEL: CPT

## 2021-01-11 RX ORDER — ZOLPIDEM TARTRATE 5 MG/1
5 TABLET ORAL NIGHTLY PRN
Qty: 30 TABLET | Refills: 1 | OUTPATIENT
Start: 2021-01-11 | End: 2021-02-10

## 2021-01-11 RX ORDER — ZOLPIDEM TARTRATE 5 MG/1
TABLET ORAL
COMMUNITY
Start: 2021-01-06 | End: 2021-01-11

## 2021-02-02 DIAGNOSIS — F51.01 PRIMARY INSOMNIA: ICD-10-CM

## 2021-02-02 DIAGNOSIS — E03.9 HYPOTHYROIDISM, UNSPECIFIED TYPE: ICD-10-CM

## 2021-02-02 RX ORDER — LEVOTHYROXINE SODIUM 88 UG/1
TABLET ORAL
Qty: 90 TABLET | Refills: 1 | Status: SHIPPED | OUTPATIENT
Start: 2021-02-02 | End: 2021-09-08 | Stop reason: SDUPTHER

## 2021-02-02 RX ORDER — ZOLPIDEM TARTRATE 5 MG/1
5 TABLET ORAL NIGHTLY PRN
Qty: 30 TABLET | Refills: 1 | Status: SHIPPED | OUTPATIENT
Start: 2021-02-02 | End: 2021-03-29 | Stop reason: SDUPTHER

## 2021-02-18 ENCOUNTER — OFFICE VISIT (OUTPATIENT)
Dept: INTERNAL MEDICINE CLINIC | Age: 81
End: 2021-02-18
Payer: COMMERCIAL

## 2021-02-18 VITALS
WEIGHT: 134.8 LBS | HEART RATE: 65 BPM | SYSTOLIC BLOOD PRESSURE: 90 MMHG | TEMPERATURE: 97.3 F | OXYGEN SATURATION: 98 % | RESPIRATION RATE: 18 BRPM | DIASTOLIC BLOOD PRESSURE: 60 MMHG | BODY MASS INDEX: 24.66 KG/M2

## 2021-02-18 DIAGNOSIS — Z00.00 GENERAL MEDICAL EXAM: ICD-10-CM

## 2021-02-18 DIAGNOSIS — J45.20 MILD INTERMITTENT ASTHMA, UNSPECIFIED WHETHER COMPLICATED: ICD-10-CM

## 2021-02-18 DIAGNOSIS — E78.2 MIXED HYPERLIPIDEMIA: ICD-10-CM

## 2021-02-18 DIAGNOSIS — E03.9 HYPOTHYROIDISM, UNSPECIFIED TYPE: Primary | ICD-10-CM

## 2021-02-18 PROCEDURE — 99214 OFFICE O/P EST MOD 30 MIN: CPT | Performed by: FAMILY MEDICINE

## 2021-02-18 RX ORDER — GABAPENTIN 300 MG/1
300 CAPSULE ORAL NIGHTLY
Qty: 90 CAPSULE | Refills: 1 | Status: SHIPPED | OUTPATIENT
Start: 2021-02-18 | End: 2022-08-22

## 2021-02-18 RX ORDER — ALBUTEROL SULFATE 90 UG/1
2 AEROSOL, METERED RESPIRATORY (INHALATION) EVERY 6 HOURS PRN
Qty: 1 INHALER | Refills: 3 | Status: SHIPPED | OUTPATIENT
Start: 2021-02-18

## 2021-02-18 ASSESSMENT — ENCOUNTER SYMPTOMS
VOMITING: 0
CONSTIPATION: 0
CHEST TIGHTNESS: 0
SHORTNESS OF BREATH: 0
SORE THROAT: 0
DIARRHEA: 0
ABDOMINAL PAIN: 0
COLOR CHANGE: 0

## 2021-02-18 NOTE — PROGRESS NOTES
Subjective:      Chief Complaint   Patient presents with    6 Month Follow-Up       HPI:  Cr Villarreal is a 79 y.o. female who presents today for follow up of chronic conditions as listed below. HTN:  States BP's at home are usually 100-110's/60's. Denying any symptoms. Being followed by cardiology. Was started on lipitor at last appointment, has been tolerating without issues. Abdominal pain has resolved since last appointment. Asthma:  Well controlled, uses albuterol once a month at most.    Insomnia:  Has been taking Ambien for over 15 years. Has tried melatonin but did not work. Tried taking 1/2 tab of Ambien, but usually needs the second half or she is still awake at 2AM.  Has not had any issues with Ambien. Has lost about 15 pounds over the past few months with dietary modifications and states she is feeling great. No acute concerns today. Past Medical History:   Diagnosis Date    Abnormal EKG     Adenomatous colon polyp 11/2014    Anxiety     Atrial fibrillation with RVR (HCC) 03/18/2018    Chronic anticoagulation 03/2018    A Fib; Xarelto    Chronic radicular lumbar pain 9/2013    Left; Manolo Salty;  [place]    Depression with anxiety 2004    Eczema     History of echocardiogram 11/23/2020    EF 55-60%, Mild left ventricular hypertrophy, mildly dilated left atrium, aneurysmal interatrial septum, Mild MR, AR, TR, OK, No pericardial effusion    History of exercise stress test 11/23/2020    Treadmill, normal    History of Holter monitoring 01/11/2019    Blocked APCS    Hypothyroidism 07/2012    Insomnia     LBBB (left bundle branch block)     Osteoarthritis of thumb     base of thumbs bilateral    S/P ablation of atrial fibrillation 08/2018    S/P knee replacement     bilateral    SOB (shortness of breath)         Past Surgical History:   Procedure Laterality Date    ABLATION OF DYSRHYTHMIC FOCUS  08/20/2018    BREAST BIOPSY Bilateral     CATARACT REMOVAL WITH IMPLANT Bilateral 2020     SECTION      X 3    TOTAL KNEE ARTHROPLASTY Right 2017    Molina Ortiz TOTAL KNEE ARTHROPLASTY Left 2018    Dr Marycruz Sawant       Social History     Tobacco Use    Smoking status: Never Smoker    Smokeless tobacco: Never Used   Substance Use Topics    Alcohol use: No     Comment: No caffeine        Review of Systems   Constitutional: Negative for activity change, appetite change, chills, fever and unexpected weight change. HENT: Negative for congestion and sore throat. Respiratory: Negative for chest tightness and shortness of breath. Cardiovascular: Negative for chest pain and palpitations. Gastrointestinal: Negative for abdominal pain, constipation, diarrhea and vomiting. Genitourinary: Negative for dysuria. Skin: Negative for color change. Neurological: Negative for dizziness and light-headedness. Psychiatric/Behavioral: Negative for dysphoric mood. The patient is not nervous/anxious. Prior to Visit Medications    Medication Sig Taking? Authorizing Provider   zolpidem (AMBIEN) 5 MG tablet Take 1 tablet by mouth nightly as needed for Sleep for up to 30 doses. Yes Esther Rodrigez MD   levothyroxine (SYNTHROID) 88 MCG tablet TAKE 1 TABLET DAILY Yes Esther Rodrigez MD   citalopram (CELEXA) 40 MG tablet Take 1 tablet by mouth daily Yes Esther Rodrigez MD   gabapentin (NEURONTIN) 300 MG capsule Take 1 capsule by mouth nightly for 180 days.  Yes Esther Rodrigez MD   dilTIAZem (CARDIZEM CD) 120 MG extended release capsule Take 1 capsule by mouth daily Yes Anthony Londono MD   rivaroxaban (XARELTO) 20 MG TABS tablet TAKE 1 TABLET DAILY Yes Esther Rodrigez MD   pantoprazole (PROTONIX) 40 MG tablet Take 1 tablet by mouth daily Yes Esther Rodrigez MD   atorvastatin (LIPITOR) 20 MG tablet Take 1 tablet by mouth daily Yes Esther Rodrigez MD   Handicap Placard MISC by Does not apply route Good for 1 year Yes Janna Dandy, MD   Magnesium Oxide 250 MG TABS Take 1 tablet by mouth daily Yes Maylin Urena MD   albuterol sulfate HFA (PROVENTIL HFA) 108 (90 Base) MCG/ACT inhaler Inhale 2 puffs into the lungs every 6 hours as needed for Wheezing Yes Latia Lorenzo MD          Objective:      BP 90/60   Pulse 65   Temp 97.3 °F (36.3 °C)   Resp 18   Wt 134 lb 12.8 oz (61.1 kg)   SpO2 98%   BMI 24.66 kg/m²      Physical Exam  Vitals signs and nursing note reviewed. Constitutional:       General: She is not in acute distress. Appearance: Normal appearance. She is not ill-appearing or toxic-appearing. HENT:      Head: Normocephalic and atraumatic. Right Ear: External ear normal.      Left Ear: External ear normal.      Nose: Nose normal.      Mouth/Throat:      Pharynx: Oropharynx is clear. Eyes:      General: No scleral icterus. Right eye: No discharge. Left eye: No discharge. Extraocular Movements: Extraocular movements intact. Conjunctiva/sclera: Conjunctivae normal.   Neck:      Musculoskeletal: Normal range of motion and neck supple. No neck rigidity. Cardiovascular:      Rate and Rhythm: Normal rate and regular rhythm. Heart sounds: Normal heart sounds. Pulmonary:      Effort: Pulmonary effort is normal.      Breath sounds: Normal breath sounds. No wheezing or rales. Musculoskeletal:         General: No deformity. Skin:     General: Skin is warm and dry. Findings: No rash. Neurological:      General: No focal deficit present. Mental Status: She is alert. Mental status is at baseline. Motor: No weakness. Psychiatric:         Mood and Affect: Mood normal.         Behavior: Behavior normal.            Assessment / Plan:      1. Hypothyroidism, unspecified type  Last TSH wnl. Will monitor labs, continue current dose of synthroid. - TSH without Reflex; Future    2.  Mixed hyperlipidemia  Lipid panel significantly improved- likely due to combination of starting statin and lifestyle modifications. Continue lipitor. 3. General medical exam  - CBC Auto Differential; Future  - Comprehensive Metabolic Panel; Future    4. Mild intermittent asthma, unspecified whether complicated  Stable, well controlled. Continue current medications. - albuterol sulfate HFA (PROVENTIL HFA) 108 (90 Base) MCG/ACT inhaler; Inhale 2 puffs into the lungs every 6 hours as needed for Wheezing  Dispense: 1 Inhaler; Refill: 3          Patient voiced understanding and agreement with plan. All questions/concerns were addressed, risks/side effects of medications were reviewed. Return precautions and after visit summary were provided. Return in about 6 months (around 8/18/2021). or earlier as needed.       David Fraser MD

## 2021-03-03 ENCOUNTER — PATIENT MESSAGE (OUTPATIENT)
Dept: INTERNAL MEDICINE CLINIC | Age: 81
End: 2021-03-03

## 2021-03-05 NOTE — TELEPHONE ENCOUNTER
From: Erika Canada  To: Candido Pavon MD  Sent: 3/3/2021 7:29 PM EST  Subject: Non-Urgent Medical Question    Dr Lilly Landaverde,  Nhi Nelson, not yet a patient of yours,[birth year]. Very healthy, takes no meds. Just had a TIA? ? Temporary blindness in R eye lasting 5min. /77 HR 60. No other symptoms. Tongue midline. JODY  equal and strong. Seems fine now. Will call for appointment tomorrow.

## 2021-03-29 DIAGNOSIS — F51.01 PRIMARY INSOMNIA: ICD-10-CM

## 2021-03-29 RX ORDER — ZOLPIDEM TARTRATE 5 MG/1
5 TABLET ORAL NIGHTLY PRN
Qty: 30 TABLET | Refills: 1 | Status: SHIPPED | OUTPATIENT
Start: 2021-03-29 | End: 2021-05-27 | Stop reason: SDUPTHER

## 2021-04-15 ENCOUNTER — PATIENT MESSAGE (OUTPATIENT)
Dept: INTERNAL MEDICINE CLINIC | Age: 81
End: 2021-04-15

## 2021-04-16 NOTE — TELEPHONE ENCOUNTER
From: Nu Martinez  To: Hank Witt MD  Sent: 4/15/2021 8:19 AM EDT  Subject: Non-Urgent Medical Question    Would Dr Ceja Fairly please renew my prescription for a handicap placard for my car.  Thank you

## 2021-04-19 ENCOUNTER — TELEPHONE (OUTPATIENT)
Dept: INTERNAL MEDICINE CLINIC | Age: 81
End: 2021-04-19

## 2021-04-19 NOTE — TELEPHONE ENCOUNTER
Farida Wetzel came in and said she had spoken to you about taking on her  as a patient but she has not heard anything back yet. Can someone check on this and call her?  Her ;s name is [employer]

## 2021-04-26 ENCOUNTER — APPOINTMENT (OUTPATIENT)
Dept: CT IMAGING | Age: 81
End: 2021-04-26
Payer: COMMERCIAL

## 2021-04-26 ENCOUNTER — HOSPITAL ENCOUNTER (EMERGENCY)
Age: 81
Discharge: HOME | End: 2021-04-26
Attending: EMERGENCY MEDICINE
Payer: COMMERCIAL

## 2021-04-26 VITALS
RESPIRATION RATE: 13 BRPM | OXYGEN SATURATION: 97 % | DIASTOLIC BLOOD PRESSURE: 66 MMHG | TEMPERATURE: 98.3 F | SYSTOLIC BLOOD PRESSURE: 144 MMHG | WEIGHT: 133 LBS | BODY MASS INDEX: 24.48 KG/M2 | HEIGHT: 62 IN | HEART RATE: 67 BPM

## 2021-04-26 DIAGNOSIS — S09.90XA CLOSED HEAD INJURY, INITIAL ENCOUNTER: Primary | ICD-10-CM

## 2021-04-26 DIAGNOSIS — S00.83XA CONTUSION OF FACE, INITIAL ENCOUNTER: ICD-10-CM

## 2021-04-26 DIAGNOSIS — T14.8XXA ABRASION: ICD-10-CM

## 2021-04-26 DIAGNOSIS — S02.2XXA CLOSED FRACTURE OF NASAL BONE, INITIAL ENCOUNTER: ICD-10-CM

## 2021-04-26 PROCEDURE — 70486 CT MAXILLOFACIAL W/O DYE: CPT

## 2021-04-26 PROCEDURE — 6370000000 HC RX 637 (ALT 250 FOR IP): Performed by: EMERGENCY MEDICINE

## 2021-04-26 PROCEDURE — 72125 CT NECK SPINE W/O DYE: CPT

## 2021-04-26 PROCEDURE — 99285 EMERGENCY DEPT VISIT HI MDM: CPT

## 2021-04-26 PROCEDURE — 70450 CT HEAD/BRAIN W/O DYE: CPT

## 2021-04-26 RX ORDER — ACETAMINOPHEN 500 MG
1000 TABLET ORAL ONCE
Status: COMPLETED | OUTPATIENT
Start: 2021-04-26 | End: 2021-04-26

## 2021-04-26 RX ORDER — DIAPER,BRIEF,INFANT-TODD,DISP
EACH MISCELLANEOUS ONCE
Status: COMPLETED | OUTPATIENT
Start: 2021-04-26 | End: 2021-04-26

## 2021-04-26 RX ADMIN — ACETAMINOPHEN 1000 MG: 500 TABLET ORAL at 14:59

## 2021-04-26 RX ADMIN — BACITRACIN: 500 OINTMENT TOPICAL at 14:59

## 2021-04-26 ASSESSMENT — PAIN SCALES - GENERAL: PAINLEVEL_OUTOF10: 2

## 2021-04-26 NOTE — ED PROVIDER NOTES
Emergency Department Encounter    Patient: Roya Soto  MRN: [id number]  : 1940  Date of Evaluation: 2021  ED Provider:  Willard Bravo    Triage Chief Complaint:   Frank Connell    [place]:  Roya Soto is a 79 y.o. female that presents after falling, she was walking tripped fell face first onto the ground, has bruising to her face. This occurred this morning she has sharp mild constant pain to her face, no back chest or abdominal pain. No pain to her extremities. She was able to ambulate. No loss of consciousness. She is on blood thinners. She took nothing for treatment, nothing makes symptoms better or worse. ROS - see HPI, below listed is current ROS at time of my eval:  General:  no weakness  Eyes:  No recent vison changes  ENT:  No sore throat, no nasal congestion, no hearing changes  Cardiovascular:  No chest pain  Respiratory:  No shortness of breath  Gastrointestinal:  No pain, no nausea, no vomiting  Musculoskeletal:  No muscle pain, no joint pain  Skin:  No rash, No wounds  Neurologic:  No speech problems, + headache, no extremity numbness, no extremity tingling, no extremity weakness  Genitourinary: no hematuria  Extremities:  no edema, no pain    Past Medical History:   Diagnosis Date    Abnormal EKG     Adenomatous colon polyp 2014    Anxiety     Atrial fibrillation with RVR (HCC) 2018    Chronic anticoagulation 2018    A Fib; Xarelto    Chronic radicular lumbar pain 2013    Left; [place];  [place]    Depression with anxiety 2004    Eczema     History of echocardiogram 2020    EF 55-60%, Mild left ventricular hypertrophy, mildly dilated left atrium, aneurysmal interatrial septum, Mild MR, AR, TR, MA, No pericardial effusion    History of exercise stress test 2020    Treadmill, normal    History of Holter monitoring 2019    Blocked APCS    Hypothyroidism 2012    Insomnia     LBBB (left bundle branch block)     Osteoarthritis Frequency Provider Last Rate Last Admin    acetaminophen (TYLENOL) tablet 1,000 mg  1,000 mg Oral Once Tracee Israel MD        bacitracin zinc ointment   Topical Once Tracee Israel MD         Current Outpatient Medications   Medication Sig Dispense Refill    Handicap Placard MISC by Does not apply route Good for 5 years 1 each 0    zolpidem (AMBIEN) 5 MG tablet Take 1 tablet by mouth nightly as needed for Sleep for up to 60 doses. 30 tablet 1    pantoprazole (PROTONIX) 40 MG tablet TAKE 1 TABLET DAILY 90 tablet 1    albuterol sulfate HFA (PROVENTIL HFA) 108 (90 Base) MCG/ACT inhaler Inhale 2 puffs into the lungs every 6 hours as needed for Wheezing 1 Inhaler 3    gabapentin (NEURONTIN) 300 MG capsule Take 1 capsule by mouth nightly for 180 days. 90 capsule 1    levothyroxine (SYNTHROID) 88 MCG tablet TAKE 1 TABLET DAILY 90 tablet 1    citalopram (CELEXA) 40 MG tablet Take 1 tablet by mouth daily 90 tablet 3    dilTIAZem (CARDIZEM CD) 120 MG extended release capsule Take 1 capsule by mouth daily 90 capsule 3    rivaroxaban (XARELTO) 20 MG TABS tablet TAKE 1 TABLET DAILY 90 tablet 3    atorvastatin (LIPITOR) 20 MG tablet Take 1 tablet by mouth daily 90 tablet 3    Magnesium Oxide 250 MG TABS Take 1 tablet by mouth daily 30 tablet 5     No Known Allergies    Nursing Notes Reviewed    Physical Exam:  Triage VS:    ED Triage Vitals   Enc Vitals Group      BP       Pulse       Resp       Temp       Temp src       SpO2       Weight       Height       Head Circumference       Peak Flow       Pain Score       Pain Loc       Pain Edu? Excl. in [address]? My pulse ox interpretation is - normal    Primary exam:  Airway: Intact. Speaking in normal voice. Breathing: Spontaneous. Equal chest rise and breath sounds. Circulation: Heart RRR. Pulses 2+. Secondary exam:   GENERAL APPEARANCE: Awake and alert. Cooperative. No acute distress. HEAD: Normocephalic.   Patient has periorbital ecchymosis bilaterally with facial swelling, also bruising abrasions to her nasal bridge, no facial instability, no depressed skull fractures. EYES: EOM's grossly intact. No [place] Eyes. ENT: Oral mucosa moist, Tolerates saliva. No Gerard sign. NECK: Trachea midline, no obvious masses  CHEST/LUNGS: Non-tender. Respirations nonlabored. HEART: Regular  ABDOMEN: Soft. Non-distended. Non-tender. No guarding or rebound. Normal bowel sounds. BACK: No cervical thoracic or lumbar midline tenderness to palpation, step-offs or deformities. EXTREMITIES: Upper and lower extremities have no acute deformities and they are non-tender to palpation. Good ROM. SKIN: Warm and dry. No acute wounds  NEUROLOGICAL: Alert and oriented. No gross facial drooping. Strength 5/5 in upper and lower extremities Light touch sensation intact throughout. Perfusion:  pulses intact and equal in all extremities      I have reviewed and interpreted all of the currently available lab results from this visit (if applicable):  No results found for this visit on 04/26/21. Radiographs (if obtained):  Radiologist's Report Reviewed:  No results found. EKG (if obtained): (All EKG's are interpreted by myself in the absence of a cardiologist)      MDM:  She is here after trip and fall, she has facial injuries, head and cervical spine do not have acute abnormalities, facial CT has nasal bone fractures, and a abrasion that does not require repair, this will be cleaned and dressed, patient educated will be discharged to follow-up they understand and agree return warnings given    Clinical Impression:  1. Closed head injury, initial encounter    2. Contusion of face, initial encounter    3. Closed fracture of nasal bone, initial encounter    4.  Abrasion      Disposition referral (if applicable):  Nelda Alicia MD  [facility] [phone]    In 1 week      Disposition medications (if applicable):  New Prescriptions    No medications on file       Comment: Please note this report has been produced using speech recognition software and may contain errors related to that system including errors in grammar, punctuation, and spelling, as well as words and phrases that may be inappropriate. Efforts were made to edit the dictations. ,      Chevy Jimeenz MD  04/26/21 [id number]

## 2021-04-27 ENCOUNTER — CARE COORDINATION (OUTPATIENT)
Dept: CARE COORDINATION | Age: 81
End: 2021-04-27

## 2021-04-27 NOTE — CARE COORDINATION
[address] [facility] ED Follow Up Call    2021    Patient: Ava Santos Patient : 1940   MRN: <[id number]>  Reason for Admission:   fall  Discharge Date:  21    Patient contacted regarding Blondell Crumbly. Discussed COVID-19 related testing which was not done at this time. Test results were not done. Patient informed of results, if available? No    Ambulatory Care Manager contacted the patient by telephone to perform post discharge assessment. Call within 2 business days of discharge: Yes. Verified name and  with patient as identifiers. Provided introduction to self, and explanation of the ACM role, and reason for call due to risk factors for infection and/or exposure to COVID-19. Patient is very kind, pleasant. Patient reports that she is feeling better; bruising is healing. Denies further falls, new s/s injury or signs of bleeding. Instructed patient on safety/ fall prevention measures. Patient reports that she does not use DME and is \" being very careful\". Symptoms reviewed with patient who verbalized the following symptoms: no new symptoms. Due to no new or worsening symptoms encounter was not routed to provider for escalation. Discussed follow-up appointments. If no appointment was previously scheduled, appointment scheduling offered: Yes:  Patient verbalized her plan for follow through. [facility] follow up appointment(s):   Future Appointments   Date Time Provider Betito Mercedes   2021  1:20 PM MEY Munguia - CNP [facility]   2021  1:00 PM MD Gallo Baez [facility]     Non-[facility] follow up appointment(s):    Non-face-to-face services provided:  Education of patient/family/caregiver/guardian to support self-management-1     Advance Care Planning:   Does patient have an Advance Directive:  not on file. Patient has following risk factors of: Atrial Flutter.  ACM reviewed discharge instructions, medical action plan and red flags with patient. Patient confirms that she has completed the [place] vaccination series for COVID-19. Patient was given an opportunity for questions and concerns. The patient agrees to contact her PCP office for questions related to their healthcare. ACM offered contact information for future needs; patient declines. No medication changes noted. Was patient discharged with a pulse oximeter? No.    Patient is independent at home with strong support from family. Patient denies any issue with transportation or with covering the cost of her medications. Denies any barriers to care or support needs. Patient/family/caregiver given information for [employer] and agrees to enroll: declines    Patient denies the need for further follow up calls/ ACM enrollement at this time.         Care Transitions ED Follow Up    Care Transitions Interventions  Do you have any ongoing symptoms?: No   Do you have a copy of your discharge instructions?: Yes   Do you understand what to report and when to return?: Yes   Are you following your discharge instructions?: Yes   Do you have all of your prescriptions and are they filled?: Yes   Have you scheduled your follow up appointment?: Yes   How are you going to get to your appointment?: Car - drive self   Were you discharged with any Home Care or Post Acute Services or do you currently have any active services?: No

## 2021-05-06 ENCOUNTER — OFFICE VISIT (OUTPATIENT)
Dept: CARDIOLOGY CLINIC | Age: 81
End: 2021-05-06
Payer: COMMERCIAL

## 2021-05-06 VITALS
DIASTOLIC BLOOD PRESSURE: 72 MMHG | HEIGHT: 62 IN | HEART RATE: 74 BPM | BODY MASS INDEX: 25.4 KG/M2 | SYSTOLIC BLOOD PRESSURE: 114 MMHG | WEIGHT: 138 LBS

## 2021-05-06 DIAGNOSIS — I48.0 PAROXYSMAL ATRIAL FIBRILLATION (HCC): Primary | ICD-10-CM

## 2021-05-06 DIAGNOSIS — E78.5 DYSLIPIDEMIA: ICD-10-CM

## 2021-05-06 PROCEDURE — 99214 OFFICE O/P EST MOD 30 MIN: CPT | Performed by: NURSE PRACTITIONER

## 2021-05-06 ASSESSMENT — ENCOUNTER SYMPTOMS
SHORTNESS OF BREATH: 0
COUGH: 0

## 2021-05-06 NOTE — PATIENT INSTRUCTIONS
Please be informed that if you contact our office outside of normal business hours the physician on call cannot help with any scheduling or rescheduling issues, procedure instruction questions or any type of medication issue. We advise you for any urgent/emergency that you go to the nearest emergency room!     PLEASE CALL OUR OFFICE DURING NORMAL BUSINESS HOURS    Monday - Friday   8 am to 5 pm    [place]: [place] [address]: [phone]    [place]:  [phone]

## 2021-05-06 NOTE — PROGRESS NOTES
[facility] [address] [facility]  Phone: [phone]    Fax [phone]    Britany Shipman MD, Rehan Aquino MD, Taco Brown MD, MD Fausto Lebron MD Bettyjane Mola, MD Nels Clubs, MD Isidro Sanchez, MEY Gill, MEY Morenough, [facility], [facility]    CARDIOLOGY  NOTE    2021    Shira Leal (:  1940) is a 79 y.o. female,Established patient with Dr. Marlo Kanner, here for evaluation of the following chief complaint(s):  6 Month Follow-Up    SUBJECTIVE/OBJECTIVE:    Patient is her to follow up on the following:  Paroxysmal atrial fibrillation ([facility] [address]  (primary encounter diagnosis)  Dyslipidemia      Patient states that she is feeling well. She denies having any cardiac issues. Patient is a non-smoker. Patient denies issues obtaining or taking medications. Patient is active and does do organized exercise. Patient denies chest pain, shortness of breath, palpitations, dizziness, orthopnea, lower leg swelling, or syncope. Review of Systems   Constitutional: Negative for fatigue and fever. Respiratory: Negative for cough and shortness of breath. Cardiovascular: Negative for chest pain, palpitations and leg swelling. Musculoskeletal: Negative for arthralgias and gait problem. Neurological: Negative for dizziness, syncope, weakness, light-headedness and headaches. Vitals:    21 1321   BP: 114/72   Pulse: 74   Weight: 138 lb (62.6 kg)   Height: 5' 2\" (1.575 m)       Wt Readings from Last 3 Encounters:   21 138 lb (62.6 kg)   21 133 lb (60.3 kg)   21 134 lb 12.8 oz (61.1 kg)       BP Readings from Last 3 Encounters:   21 114/72   21 (!) 144/66   21 90/60       Prior to Admission medications    Medication Sig Start Date End Date Taking?  Authorizing Provider   Handicap Placard MISC by Does not apply route Good for 5 years 21 Yes Adilson Duque MD   zolpidem (AMBIEN) 5 MG tablet Take 1 tablet by mouth nightly as needed for Sleep for up to 60 doses. 3/29/21 8/1/21 Yes Adilson Duque MD   pantoprazole (PROTONIX) 40 MG tablet TAKE 1 TABLET DAILY 2/26/21  Yes Zhanna Aiken MD   albuterol sulfate HFA (PROVENTIL HFA) 108 (90 Base) MCG/ACT inhaler Inhale 2 puffs into the lungs every 6 hours as needed for Wheezing 2/18/21  Yes Adilson Duque MD   gabapentin (NEURONTIN) 300 MG capsule Take 1 capsule by mouth nightly for 180 days. 2/18/21 8/17/21 Yes Adilson Duque MD   levothyroxine (SYNTHROID) 88 MCG tablet TAKE 1 TABLET DAILY 2/2/21  Yes Adilson Duque MD   citalopram (CELEXA) 40 MG tablet Take 1 tablet by mouth daily 10/15/20  Yes Adilson Duque MD   dilTIAZem (CARDIZEM CD) 120 MG extended release capsule Take 1 capsule by mouth daily 10/5/20  Yes Sukh Jenkins MD   rivaroxaban (XARELTO) 20 MG TABS tablet TAKE 1 TABLET DAILY 8/18/20  Yes Adilson Duque MD   atorvastatin (LIPITOR) 20 MG tablet Take 1 tablet by mouth daily 8/18/20  Yes Adilson Duque MD   Magnesium Oxide 250 MG TABS Take 1 tablet by mouth daily 2/8/19  Yes Sukh Jenkins MD       Physical Exam  Vitals signs reviewed. Constitutional:       General: She is not in acute distress. Appearance: Normal appearance. She is not ill-appearing. HENT:      Head: Atraumatic. Neck:      Musculoskeletal: Neck supple. No muscular tenderness. Vascular: No carotid bruit. Cardiovascular:      Rate and Rhythm: Normal rate and regular rhythm. Pulses: Normal pulses. Heart sounds: Normal heart sounds. No murmur. Pulmonary:      Effort: Pulmonary effort is normal. No respiratory distress. Breath sounds: Normal breath sounds. Musculoskeletal:         General: No swelling or deformity. Neurological:      Mental Status: She is alert.          Health Maintenance   Topic Date Due    Hepatitis C screen  Never done   Sol Villalobos DTaP/Tdap/Td vaccine (1 - Tdap) 07[birth year]22[birth year]1960    Shingles Vaccine (2 of 3) 01/30/2012    Lipid screen  01/11/2022    TSH testing  01/11/2022    DEXA (modify frequency per FRAX score)  Completed    Flu vaccine  Completed    Pneumococcal 65+ years Vaccine  Completed    COVID-19 Vaccine  Completed    Hepatitis A vaccine  Aged Out    Hepatitis B vaccine  Aged Out    Hib vaccine  Aged Out    Meningococcal (ACWY) vaccine  Aged Out           ASSESSMENT/PLAN:    1. Paroxysmal atrial fibrillation (HCC)  Assessment & Plan:   Rate controlled yes.  Post afib/flutter ablation 2018 by Dr. Kwame Begum score is 3   She is on anticoagulation.  Continue Cardizem    No tobacco, caffeine, or alcohol. [id number] Score for Atrial Fibrillation Stroke Risk   Risk   Factors  Component Value   C CHF No 0   H HTN No 0   A2 Age >= 76 Yes,  (76 y.o.) 2   D DM No 0   S2 Prior Stroke/TIA No 0   V Vascular Disease No 0   A Age 74-69 No,  (76 y.o.) 0   Sc Sex female 1    [id number]  Score  3   Score last updated 5/6/21 7:76 PM EDT    Click here for a link to the UpToDate guideline \"Atrial Fibrillation: Anticoagulation therapy to prevent embolization    Disclaimer: Risk Score calculation is dependent on accuracy of patient problem list and past encounter diagnosis. 2. Dyslipidemia  Assessment & Plan:   Lipid panel reviewed from 1/2021   Patient LDL is  at goal, HDL is at goal, triglycerides normal   Continue lipitor, will decrease to lipitor to 10 mg daily   Discussed with the patient the need for exercise, low cholesterol diet, and compliance with medications. Results for Estrellita Palumbo (MRN [id number]) as of 5/6/2021 13:36   Ref.  Range 1/11/2021 11:41   Cholesterol Latest Ref Range: <200 MG/   HDL Cholesterol Latest Ref Range: >40 MG/DL 64   LDL Direct Latest Ref Range: <100 MG/DL 55   Triglycerides Latest Ref Range: <150 MG/DL 43       Return in about 6 months (around 11/6/2021) for with Dr. Christa Ohara, or jose eduardo if needed. An electronic signature was used to authenticate this note.     Electronically signed by MEY Lagunas CNP on 5/6/2021 at 1:34 PM

## 2021-05-06 NOTE — LETTER
Dr. Miky Bey  1940  [id number]    Have you had any Chest Pain that is not new? - No          Have you had any Shortness of Breath - No      Have you had any dizziness - No      Have you had any palpitations that are not new?  - No    Do you have any edema - swelling in No        Do you have a surgery or procedure scheduled in the near future - No

## 2021-05-06 NOTE — ASSESSMENT & PLAN NOTE
 Lipid panel reviewed from 1/2021   Patient LDL is  at goal, HDL is at goal, triglycerides normal   Continue lipitor, will decrease to lipitor to 10 mg daily   Discussed with the patient the need for exercise, low cholesterol diet, and compliance with medications. Results for Nathaly Alva (MRN [id number]) as of 5/6/2021 13:36   Ref.  Range 1/11/2021 11:41   Cholesterol Latest Ref Range: <200 MG/   HDL Cholesterol Latest Ref Range: >40 MG/DL 64   LDL Direct Latest Ref Range: <100 MG/DL 55   Triglycerides Latest Ref Range: <150 MG/DL 43

## 2021-05-27 DIAGNOSIS — F51.01 PRIMARY INSOMNIA: ICD-10-CM

## 2021-05-27 RX ORDER — ZOLPIDEM TARTRATE 5 MG/1
5 TABLET ORAL NIGHTLY PRN
Qty: 30 TABLET | Refills: 2 | Status: SHIPPED | OUTPATIENT
Start: 2021-05-27 | End: 2021-08-02

## 2021-06-17 RX ORDER — RIVAROXABAN 20 MG/1
TABLET, FILM COATED ORAL
Qty: 30 TABLET | Refills: 0 | Status: SHIPPED | OUTPATIENT
Start: 2021-06-17 | End: 2021-08-31

## 2021-06-17 NOTE — TELEPHONE ENCOUNTER
Patient has been out for  10 days   She is waiting for a shipment  From [address] she was   Unsure why PCP wrote last time   Asking for a short term to [facility]

## 2021-07-26 RX ORDER — PANTOPRAZOLE SODIUM 40 MG/1
TABLET, DELAYED RELEASE ORAL
Qty: 90 TABLET | Refills: 3 | Status: SHIPPED | OUTPATIENT
Start: 2021-07-26 | End: 2022-07-22

## 2021-08-02 DIAGNOSIS — F51.01 PRIMARY INSOMNIA: ICD-10-CM

## 2021-08-02 RX ORDER — ZOLPIDEM TARTRATE 5 MG/1
TABLET ORAL
Qty: 30 TABLET | Refills: 2 | Status: SHIPPED | OUTPATIENT
Start: 2021-08-02 | End: 2021-08-17 | Stop reason: SDUPTHER

## 2021-08-09 ENCOUNTER — HOSPITAL ENCOUNTER (OUTPATIENT)
Age: 81
Discharge: HOME | End: 2021-08-09
Payer: COMMERCIAL

## 2021-08-09 LAB
ALBUMIN SERPL-MCNC: 3.9 GM/DL (ref 3.4–5)
ALP BLD-CCNC: 81 IU/L (ref 40–128)
ALT SERPL-CCNC: 10 U/L (ref 10–40)
ANION GAP SERPL CALCULATED.3IONS-SCNC: 7 MMOL/L (ref 4–16)
AST SERPL-CCNC: 15 IU/L (ref 15–37)
BASOPHILS ABSOLUTE: 0 K/CU MM
BASOPHILS RELATIVE PERCENT: 0.7 % (ref 0–1)
BILIRUB SERPL-MCNC: 0.4 MG/DL (ref 0–1)
BUN BLDV-MCNC: 25 MG/DL (ref 6–23)
CALCIUM SERPL-MCNC: 8.9 MG/DL (ref 8.3–10.6)
CHLORIDE BLD-SCNC: 104 MMOL/L (ref 99–110)
CO2: 27 MMOL/L (ref 21–32)
CREAT SERPL-MCNC: 0.5 MG/DL (ref 0.6–1.1)
DIFFERENTIAL TYPE: ABNORMAL
EOSINOPHILS ABSOLUTE: 0.2 K/CU MM
EOSINOPHILS RELATIVE PERCENT: 4.9 % (ref 0–3)
GFR AFRICAN AMERICAN: >60 ML/MIN/1.73M2
GFR NON-AFRICAN AMERICAN: >60 ML/MIN/1.73M2
GLUCOSE FASTING: 88 MG/DL (ref 70–99)
HCT VFR BLD CALC: 35.9 % (ref 37–47)
HEMOGLOBIN: 11.2 GM/DL (ref 12.5–16)
IMMATURE NEUTROPHIL %: 0.2 % (ref 0–0.43)
LYMPHOCYTES ABSOLUTE: 1.6 K/CU MM
LYMPHOCYTES RELATIVE PERCENT: 39.3 % (ref 24–44)
MCH RBC QN AUTO: 30.6 PG (ref 27–31)
MCHC RBC AUTO-ENTMCNC: 31.2 % (ref 32–36)
MCV RBC AUTO: 98.1 FL (ref 78–100)
MONOCYTES ABSOLUTE: 0.4 K/CU MM
MONOCYTES RELATIVE PERCENT: 10.4 % (ref 0–4)
NUCLEATED RBC %: 0 %
PDW BLD-RTO: 13.5 % (ref 11.7–14.9)
PLATELET # BLD: 187 K/CU MM (ref 140–440)
PMV BLD AUTO: 9.3 FL (ref 7.5–11.1)
POTASSIUM SERPL-SCNC: 4.7 MMOL/L (ref 3.5–5.1)
RBC # BLD: 3.66 M/CU MM (ref 4.2–5.4)
SEGMENTED NEUTROPHILS ABSOLUTE COUNT: 1.8 K/CU MM
SEGMENTED NEUTROPHILS RELATIVE PERCENT: 44.5 % (ref 36–66)
SODIUM BLD-SCNC: 138 MMOL/L (ref 135–145)
T4 FREE: 1.11 NG/DL (ref 0.9–1.8)
TOTAL IMMATURE NEUTOROPHIL: 0.01 K/CU MM
TOTAL NUCLEATED RBC: 0 K/CU MM
TOTAL PROTEIN: 6.2 GM/DL (ref 6.4–8.2)
TSH HIGH SENSITIVITY: 2.74 UIU/ML (ref 0.27–4.2)
WBC # BLD: 4.1 K/CU MM (ref 4–10.5)

## 2021-08-09 PROCEDURE — 36415 COLL VENOUS BLD VENIPUNCTURE: CPT

## 2021-08-09 PROCEDURE — 80053 COMPREHEN METABOLIC PANEL: CPT

## 2021-08-09 PROCEDURE — 84443 ASSAY THYROID STIM HORMONE: CPT

## 2021-08-09 PROCEDURE — 85025 COMPLETE CBC W/AUTO DIFF WBC: CPT

## 2021-08-09 PROCEDURE — 84439 ASSAY OF FREE THYROXINE: CPT

## 2021-08-10 DIAGNOSIS — E78.2 MIXED HYPERLIPIDEMIA: ICD-10-CM

## 2021-08-10 RX ORDER — ATORVASTATIN CALCIUM 20 MG/1
TABLET, FILM COATED ORAL
Qty: 90 TABLET | Refills: 3 | Status: SHIPPED | OUTPATIENT
Start: 2021-08-10 | End: 2022-08-05

## 2021-08-17 ENCOUNTER — OFFICE VISIT (OUTPATIENT)
Dept: INTERNAL MEDICINE CLINIC | Age: 81
End: 2021-08-17
Payer: COMMERCIAL

## 2021-08-17 VITALS
HEIGHT: 62 IN | BODY MASS INDEX: 26.87 KG/M2 | OXYGEN SATURATION: 99 % | TEMPERATURE: 97.2 F | HEART RATE: 63 BPM | SYSTOLIC BLOOD PRESSURE: 118 MMHG | WEIGHT: 146 LBS | DIASTOLIC BLOOD PRESSURE: 78 MMHG

## 2021-08-17 DIAGNOSIS — E03.9 HYPOTHYROIDISM, UNSPECIFIED TYPE: ICD-10-CM

## 2021-08-17 DIAGNOSIS — F51.01 PRIMARY INSOMNIA: Primary | ICD-10-CM

## 2021-08-17 DIAGNOSIS — D64.9 ANEMIA, UNSPECIFIED TYPE: ICD-10-CM

## 2021-08-17 DIAGNOSIS — J45.20 MILD INTERMITTENT ASTHMA, UNSPECIFIED WHETHER COMPLICATED: ICD-10-CM

## 2021-08-17 DIAGNOSIS — E78.2 MIXED HYPERLIPIDEMIA: ICD-10-CM

## 2021-08-17 DIAGNOSIS — F41.8 DEPRESSION WITH ANXIETY: ICD-10-CM

## 2021-08-17 PROCEDURE — 99214 OFFICE O/P EST MOD 30 MIN: CPT | Performed by: FAMILY MEDICINE

## 2021-08-17 RX ORDER — ZOLPIDEM TARTRATE 5 MG/1
5 TABLET ORAL NIGHTLY PRN
Qty: 30 TABLET | Refills: 2 | Status: SHIPPED | OUTPATIENT
Start: 2021-08-17 | End: 2021-11-22 | Stop reason: SDUPTHER

## 2021-08-17 SDOH — ECONOMIC STABILITY: FOOD INSECURITY: WITHIN THE PAST 12 MONTHS, THE FOOD YOU BOUGHT JUST DIDN'T LAST AND YOU DIDN'T HAVE MONEY TO GET MORE.: NEVER TRUE

## 2021-08-17 SDOH — ECONOMIC STABILITY: FOOD INSECURITY: WITHIN THE PAST 12 MONTHS, YOU WORRIED THAT YOUR FOOD WOULD RUN OUT BEFORE YOU GOT MONEY TO BUY MORE.: NEVER TRUE

## 2021-08-17 ASSESSMENT — ENCOUNTER SYMPTOMS
CONSTIPATION: 0
ABDOMINAL PAIN: 0
CHEST TIGHTNESS: 0
SHORTNESS OF BREATH: 0
DIARRHEA: 0
SORE THROAT: 0
COLOR CHANGE: 0
VOMITING: 0

## 2021-08-17 ASSESSMENT — SOCIAL DETERMINANTS OF HEALTH (SDOH): HOW HARD IS IT FOR YOU TO PAY FOR THE VERY BASICS LIKE FOOD, HOUSING, MEDICAL CARE, AND HEATING?: NOT HARD AT ALL

## 2021-08-17 NOTE — PROGRESS NOTES
Subjective:      Chief Complaint   Patient presents with    6 Month Follow-Up    Medication Refill       HPI:  Tyrone Spencer is a [de-identified] y.o. female who presents today for follow up of chronic conditions as listed below. HLP:  Still tolerating lipitor without issues. Has continued to work on being active and exercising as able. Asthma:  States symptoms are well controlled, has not needed to use her albuterol in a long time. Anxiety:  States she has had some family stressors which has been worsening her insomnia. States she had to double her Ambien dose for a few nights, but feels symptoms are now improving as she has been trying to implement some behavioral therapies. States she has always been told she is anemic, but reason has not been found. Denies any symptoms. Not taking any iron or vitamin B12. Feeling well today, no acute concerns. Labs reviewed. Past Medical History:   Diagnosis Date    Abnormal EKG     Adenomatous colon polyp 11/2014    Anxiety     Atrial fibrillation with RVR (HCC) 03/18/2018    Chronic anticoagulation 03/2018    A Fib; Xarelto    Chronic radicular lumbar pain 9/2013    Left; Claire Runner;  Sujatha    Depression with anxiety 2004    Eczema     History of echocardiogram 11/23/2020    EF 55-60%, Mild left ventricular hypertrophy, mildly dilated left atrium, aneurysmal interatrial septum, Mild MR, AR, TR, MD, No pericardial effusion    History of exercise stress test 11/23/2020    Treadmill, normal    History of Holter monitoring 01/11/2019    Blocked APCS    Hypothyroidism 07/2012    Insomnia     LBBB (left bundle branch block)     Osteoarthritis of thumb     base of thumbs bilateral    S/P ablation of atrial fibrillation 08/2018    S/P knee replacement     bilateral    SOB (shortness of breath)         Past Surgical History:   Procedure Laterality Date    ABLATION OF DYSRHYTHMIC FOCUS  08/20/2018    BREAST BIOPSY Bilateral     CATARACT REMOVAL WITH IMPLANT Bilateral      SECTION      X 3    TOTAL KNEE ARTHROPLASTY Right 2017    Evin Shanika TOTAL KNEE ARTHROPLASTY Left 2018    Dr Pappas       Social History     Tobacco Use    Smoking status: Never Smoker    Smokeless tobacco: Never Used   Substance Use Topics    Alcohol use: No     Comment: No caffeine        Review of Systems   Constitutional: Negative for activity change, appetite change, chills, fever and unexpected weight change. HENT: Negative for congestion and sore throat. Respiratory: Negative for chest tightness and shortness of breath. Cardiovascular: Negative for chest pain and palpitations. Gastrointestinal: Negative for abdominal pain, constipation, diarrhea and vomiting. Genitourinary: Negative for dysuria. Skin: Negative for color change. Neurological: Negative for dizziness and light-headedness. Psychiatric/Behavioral: Positive for sleep disturbance. Negative for dysphoric mood. The patient is nervous/anxious. Prior to Visit Medications    Medication Sig Taking?  Authorizing Provider   atorvastatin (LIPITOR) 20 MG tablet TAKE 1 TABLET DAILY Yes Emily Serrano MD   zolpidem (AMBIEN) 5 MG tablet TAKE 1 TABLET BY MOUTH NIGHTLY AS NEEDED FOR SLEEP Yes Emily Serrano MD   pantoprazole (PROTONIX) 40 MG tablet TAKE 1 TABLET DAILY Yes Emily Serrano MD   XARELTO 20 MG TABS tablet TAKE 1 TABLET DAILY Yes Artis Gordillo MD   Handicap Placard MISC by Does not apply route Good for 5 years Yes Emily Serrano MD   albuterol sulfate HFA (PROVENTIL HFA) 108 (90 Base) MCG/ACT inhaler Inhale 2 puffs into the lungs every 6 hours as needed for Wheezing Yes Emily Serrano MD   levothyroxine (SYNTHROID) 88 MCG tablet TAKE 1 TABLET DAILY Yes Emily Serrano MD   citalopram (CELEXA) 40 MG tablet Take 1 tablet by mouth daily Yes Emily Serrano MD   dilTIAZem (CARDIZEM CD) 120 MG extended release capsule Take 1 capsule by mouth daily Yes Dean Up MD   Magnesium Oxide 250 MG TABS Take 1 tablet by mouth daily Yes Dean Up MD   gabapentin (NEURONTIN) 300 MG capsule Take 1 capsule by mouth nightly for 180 days. Brittney Romano MD          Objective:      /78 (Site: Left Upper Arm, Position: Sitting, Cuff Size: Medium Adult)   Pulse 63   Temp 97.2 °F (36.2 °C)   Ht 5' 2\" (1.575 m)   Wt 146 lb (66.2 kg)   SpO2 99%   BMI 26.70 kg/m²      Physical Exam  Vitals and nursing note reviewed. Constitutional:       General: She is not in acute distress. Appearance: Normal appearance. She is not ill-appearing or toxic-appearing. HENT:      Head: Normocephalic and atraumatic. Right Ear: External ear normal.      Left Ear: External ear normal.      Nose: Nose normal.      Mouth/Throat:      Pharynx: Oropharynx is clear. Eyes:      General: No scleral icterus. Right eye: No discharge. Left eye: No discharge. Extraocular Movements: Extraocular movements intact. Conjunctiva/sclera: Conjunctivae normal.   Cardiovascular:      Rate and Rhythm: Normal rate and regular rhythm. Heart sounds: Normal heart sounds. Pulmonary:      Effort: Pulmonary effort is normal.      Breath sounds: Normal breath sounds. No wheezing or rales. Musculoskeletal:         General: No deformity. Cervical back: Normal range of motion and neck supple. No rigidity. Skin:     General: Skin is warm and dry. Findings: No rash. Neurological:      General: No focal deficit present. Mental Status: She is alert. Mental status is at baseline. Motor: No weakness. Psychiatric:         Mood and Affect: Mood normal.         Behavior: Behavior normal.            Assessment / Plan:      1. Primary insomnia  Discussed that she should not increase medication doses on her own (especially controlled medications).   If she feels symptoms are not adequately controlled on current medications, she needs to notify provider- patient voiced understanding.    - zolpidem (AMBIEN) 5 MG tablet; Take 1 tablet by mouth nightly as needed for Sleep for up to 90 days. Dispense: 30 tablet; Refill: 2    2. Hypothyroidism, unspecified type  Last TSH wnl. Will monitor labs, continue current dose of synthroid. - TSH without Reflex; Future    3. Mixed hyperlipidemia  Continue lipitor.   - CBC Auto Differential; Future  - Lipid Panel; Future    4. Mild intermittent asthma, unspecified whether complicated  Well controlled, continue albuterol as needed. 5. Depression with anxiety  Slight worsening recently due to family stressors, but patient reports symptoms are now improving. Advised to contact clinic if mood worsens again- can discuss adjusting medication regimen. 6.  Anemia  Chronic, stable over the past several years- patient asymptomatic. Can try OTC iron, will continue to monitor.    - CBC Auto Differential; Future       I have spent 35 minutes on this patient encounter. Patient voiced understanding and agreement with plan. All questions/concerns were addressed, risks/side effects of medications were reviewed. Return precautions and after visit summary were provided. Return in about 6 months (around 2/17/2022). or earlier as needed.       Marina Bain MD

## 2021-08-31 RX ORDER — RIVAROXABAN 20 MG/1
TABLET, FILM COATED ORAL
Qty: 90 TABLET | Refills: 3 | Status: SHIPPED | OUTPATIENT
Start: 2021-08-31 | End: 2022-08-26

## 2021-09-08 DIAGNOSIS — E03.9 HYPOTHYROIDISM, UNSPECIFIED TYPE: ICD-10-CM

## 2021-09-08 RX ORDER — LEVOTHYROXINE SODIUM 88 UG/1
TABLET ORAL
Qty: 90 TABLET | Refills: 1 | Status: SHIPPED | OUTPATIENT
Start: 2021-09-08 | End: 2022-01-31

## 2021-09-23 RX ORDER — CITALOPRAM 40 MG/1
TABLET ORAL
Qty: 90 TABLET | Refills: 3 | Status: SHIPPED | OUTPATIENT
Start: 2021-09-23 | End: 2022-09-19

## 2021-10-21 RX ORDER — DILTIAZEM HYDROCHLORIDE 120 MG/1
120 CAPSULE, COATED, EXTENDED RELEASE ORAL DAILY
Qty: 90 CAPSULE | Refills: 3 | Status: SHIPPED | OUTPATIENT
Start: 2021-10-21

## 2021-11-22 ENCOUNTER — PATIENT MESSAGE (OUTPATIENT)
Dept: INTERNAL MEDICINE CLINIC | Age: 81
End: 2021-11-22

## 2021-11-22 DIAGNOSIS — F51.01 PRIMARY INSOMNIA: ICD-10-CM

## 2021-11-22 RX ORDER — ZOLPIDEM TARTRATE 5 MG/1
5 TABLET ORAL NIGHTLY PRN
Qty: 30 TABLET | Refills: 2 | Status: SHIPPED | OUTPATIENT
Start: 2021-11-22 | End: 2022-02-01

## 2022-01-31 DIAGNOSIS — E03.9 HYPOTHYROIDISM, UNSPECIFIED TYPE: ICD-10-CM

## 2022-01-31 RX ORDER — LEVOTHYROXINE SODIUM 88 UG/1
TABLET ORAL
Qty: 90 TABLET | Refills: 3 | Status: SHIPPED | OUTPATIENT
Start: 2022-01-31

## 2022-02-01 DIAGNOSIS — F51.01 PRIMARY INSOMNIA: ICD-10-CM

## 2022-02-01 RX ORDER — ZOLPIDEM TARTRATE 5 MG/1
TABLET ORAL
Qty: 30 TABLET | Refills: 2 | Status: SHIPPED | OUTPATIENT
Start: 2022-02-01 | End: 2022-02-22 | Stop reason: SDUPTHER

## 2022-02-22 ENCOUNTER — OFFICE VISIT (OUTPATIENT)
Dept: INTERNAL MEDICINE CLINIC | Age: 82
End: 2022-02-22
Payer: COMMERCIAL

## 2022-02-22 VITALS
OXYGEN SATURATION: 96 % | HEART RATE: 73 BPM | WEIGHT: 163 LBS | DIASTOLIC BLOOD PRESSURE: 66 MMHG | BODY MASS INDEX: 29.81 KG/M2 | SYSTOLIC BLOOD PRESSURE: 124 MMHG

## 2022-02-22 DIAGNOSIS — D64.9 ANEMIA, UNSPECIFIED TYPE: ICD-10-CM

## 2022-02-22 DIAGNOSIS — E78.5 DYSLIPIDEMIA: ICD-10-CM

## 2022-02-22 DIAGNOSIS — F41.8 DEPRESSION WITH ANXIETY: ICD-10-CM

## 2022-02-22 DIAGNOSIS — F51.01 PRIMARY INSOMNIA: ICD-10-CM

## 2022-02-22 DIAGNOSIS — I48.0 PAROXYSMAL ATRIAL FIBRILLATION (HCC): ICD-10-CM

## 2022-02-22 DIAGNOSIS — E03.9 HYPOTHYROIDISM, UNSPECIFIED TYPE: Primary | ICD-10-CM

## 2022-02-22 PROCEDURE — 99214 OFFICE O/P EST MOD 30 MIN: CPT | Performed by: FAMILY MEDICINE

## 2022-02-22 RX ORDER — ZOLPIDEM TARTRATE 5 MG/1
5 TABLET ORAL NIGHTLY PRN
Qty: 30 TABLET | Refills: 2 | Status: CANCELLED | OUTPATIENT
Start: 2022-02-22 | End: 2022-05-23

## 2022-02-22 RX ORDER — ZOLPIDEM TARTRATE 5 MG/1
TABLET ORAL
Qty: 30 TABLET | Refills: 2 | Status: SHIPPED | OUTPATIENT
Start: 2022-02-22 | End: 2022-05-22

## 2022-02-22 ASSESSMENT — ENCOUNTER SYMPTOMS
COLOR CHANGE: 0
DIARRHEA: 0
ABDOMINAL PAIN: 0
VOMITING: 0
CHEST TIGHTNESS: 0
SORE THROAT: 0
SHORTNESS OF BREATH: 0
CONSTIPATION: 0

## 2022-02-22 ASSESSMENT — PATIENT HEALTH QUESTIONNAIRE - PHQ9
5. POOR APPETITE OR OVEREATING: 0
7. TROUBLE CONCENTRATING ON THINGS, SUCH AS READING THE NEWSPAPER OR WATCHING TELEVISION: 0
3. TROUBLE FALLING OR STAYING ASLEEP: 0
8. MOVING OR SPEAKING SO SLOWLY THAT OTHER PEOPLE COULD HAVE NOTICED. OR THE OPPOSITE, BEING SO FIGETY OR RESTLESS THAT YOU HAVE BEEN MOVING AROUND A LOT MORE THAN USUAL: 0
SUM OF ALL RESPONSES TO PHQ QUESTIONS 1-9: 0
9. THOUGHTS THAT YOU WOULD BE BETTER OFF DEAD, OR OF HURTING YOURSELF: 0
1. LITTLE INTEREST OR PLEASURE IN DOING THINGS: 0
SUM OF ALL RESPONSES TO PHQ9 QUESTIONS 1 & 2: 0
6. FEELING BAD ABOUT YOURSELF - OR THAT YOU ARE A FAILURE OR HAVE LET YOURSELF OR YOUR FAMILY DOWN: 0
SUM OF ALL RESPONSES TO PHQ QUESTIONS 1-9: 0
4. FEELING TIRED OR HAVING LITTLE ENERGY: 0
SUM OF ALL RESPONSES TO PHQ QUESTIONS 1-9: 0
SUM OF ALL RESPONSES TO PHQ QUESTIONS 1-9: 0
10. IF YOU CHECKED OFF ANY PROBLEMS, HOW DIFFICULT HAVE THESE PROBLEMS MADE IT FOR YOU TO DO YOUR WORK, TAKE CARE OF THINGS AT HOME, OR GET ALONG WITH OTHER PEOPLE: 0
2. FEELING DOWN, DEPRESSED OR HOPELESS: 0

## 2022-02-22 NOTE — PROGRESS NOTES
Subjective:      Chief Complaint   Patient presents with    Hyperlipidemia    Insomnia       HPI:  Rohit Mendez is a [de-identified] y.o. female who presents today for follow up of chronic conditions as listed below. Mood/insomnia:  Reports mood has been doing well on her medications. Ambien still working well for her. Anemia:  Was advised to start OTC iron at her last appointment  States she forgot to start taking, but does have some available as her  takes it. Hypothyroidism: Denying any symptoms. HTN:  BP's normal at home. States she has gained a lot of weight back over the past several months. Reports she has not been eating as healthy and has also not been as active due to the weather. Feeling well today, no acute concerns. Forgot to get labs completed. Past Medical History:   Diagnosis Date    Abnormal EKG     Adenomatous colon polyp 2014    Anxiety     Atrial fibrillation with RVR (HCC) 2018    Chronic anticoagulation 2018    A Fib; Xarelto    Chronic radicular lumbar pain 2013    Left; Jacob Coon;  Sujatha    Depression with anxiety 2004    Eczema     History of echocardiogram 2020    EF 55-60%, Mild left ventricular hypertrophy, mildly dilated left atrium, aneurysmal interatrial septum, Mild MR, AR, TR, SC, No pericardial effusion    History of exercise stress test 2020    Treadmill, normal    History of Holter monitoring 2019    Blocked APCS    Hypothyroidism 2012    Insomnia     LBBB (left bundle branch block)     Osteoarthritis of thumb     base of thumbs bilateral    S/P ablation of atrial fibrillation 2018    S/P knee replacement     bilateral    SOB (shortness of breath)         Past Surgical History:   Procedure Laterality Date    ABLATION OF DYSRHYTHMIC FOCUS  2018    BREAST BIOPSY Bilateral     CATARACT REMOVAL WITH IMPLANT Bilateral      SECTION      X 3    TOTAL KNEE ARTHROPLASTY Right 12/19/2017    Quintin Gaucher TOTAL KNEE ARTHROPLASTY Left 03/06/2018    Dr Pappas       Social History     Tobacco Use    Smoking status: Never Smoker    Smokeless tobacco: Never Used   Substance Use Topics    Alcohol use: No     Comment: No caffeine        Review of Systems   Constitutional: Negative for activity change, appetite change, chills, fever and unexpected weight change. HENT: Negative for congestion and sore throat. Respiratory: Negative for chest tightness and shortness of breath. Cardiovascular: Negative for chest pain and palpitations. Gastrointestinal: Negative for abdominal pain, constipation, diarrhea and vomiting. Genitourinary: Negative for dysuria. Skin: Negative for color change. Neurological: Negative for dizziness and light-headedness. Psychiatric/Behavioral: Negative for dysphoric mood. The patient is not nervous/anxious. Prior to Visit Medications    Medication Sig Taking? Authorizing Provider   zolpidem (AMBIEN) 5 MG tablet TAKE 1 TABLET BY MOUTH EVERY EVENING AS NEEDED FOR SLEEP  Stanley Thomas MD   levothyroxine (SYNTHROID) 88 MCG tablet TAKE 1 TABLET DAILY  Stanley Thomas MD   dilTIAZem (CARDIZEM CD) 120 MG extended release capsule Take 1 capsule by mouth daily  Julia Metz MD   citalopram (CELEXA) 40 MG tablet TAKE 1 TABLET DAILY  Stanley Thomas MD   XARELTO 20 MG TABS tablet TAKE 1 TABLET DAILY  Stanley Thomas MD   atorvastatin (LIPITOR) 20 MG tablet TAKE 1 TABLET DAILY  Stanley Thomas MD   pantoprazole (PROTONIX) 40 MG tablet TAKE 1 TABLET DAILY  Stanley Thomas MD   Handicap Placard MISC by Does not apply route Good for 5 years  Stanley Thomas MD   albuterol sulfate HFA (PROVENTIL HFA) 108 (90 Base) MCG/ACT inhaler Inhale 2 puffs into the lungs every 6 hours as needed for Wheezing  Stanley Thomas MD   gabapentin (NEURONTIN) 300 MG capsule Take 1 capsule by mouth nightly for 180 days.   Kayla Williamson MD [place]   Magnesium Oxide 250 MG TABS Take 1 tablet by mouth daily  Jolene Fisher MD          Objective:      /66   Pulse 73   Wt 163 lb (73.9 kg)   SpO2 96%   BMI 29.81 kg/m²      Physical Exam  Vitals and nursing note reviewed. Constitutional:       General: She is not in acute distress. Appearance: Normal appearance. She is not ill-appearing or toxic-appearing. HENT:      Head: Normocephalic and atraumatic. Right Ear: External ear normal.      Left Ear: External ear normal.      Nose: Nose normal.      Mouth/Throat:      Pharynx: Oropharynx is clear. Eyes:      General: No scleral icterus. Right eye: No discharge. Left eye: No discharge. Extraocular Movements: Extraocular movements intact. Conjunctiva/sclera: Conjunctivae normal.   Cardiovascular:      Rate and Rhythm: Normal rate and regular rhythm. Heart sounds: Normal heart sounds. Pulmonary:      Effort: Pulmonary effort is normal.      Breath sounds: Normal breath sounds. No wheezing or rales. Musculoskeletal:         General: No deformity. Cervical back: Normal range of motion and neck supple. No rigidity. Skin:     General: Skin is warm and dry. Findings: No rash. Neurological:      General: No focal deficit present. Mental Status: She is alert. Mental status is at baseline. Motor: No weakness. Psychiatric:         Mood and Affect: Mood normal.         Behavior: Behavior normal.            Assessment / Plan:      1. Hypothyroidism, unspecified type  Last TSH wnl. Will monitor labs, continue current dose of synthroid. - TSH; Future    2. Paroxysmal atrial fibrillation (HCC)  In NSR today, continue xarelto. 3. Dyslipidemia  Discussed lifestyle modifications and encouraged to lose weight again. Will monitor labs. - Lipid Panel; Future    4. Depression with anxiety  Stable, well controlled. Continue current medications.      5. Anemia, unspecified type  Has not gotten repeat labs completed. Will recheck and contact patient to advise starting supplementation if Hb has not improved. - CBC with Auto Differential; Future  - Comprehensive Metabolic Panel; Future  - Vitamin B12 & Folate; Future  - Iron and TIBC; Future    6. Primary insomnia  Stable, well controlled. Continue current medications. - zolpidem (AMBIEN) 5 MG tablet; TAKE 1 TABLET BY MOUTH EVERY EVENING AS NEEDED FOR SLEEP  Dispense: 30 tablet; Refill: 2            I have spent 32 minutes on this patient encounter. Patient voiced understanding and agreement with plan. All questions/concerns were addressed, risks/side effects of medications were reviewed. Return precautions and after visit summary were provided. Return in about 6 months (around 8/22/2022). or earlier as needed.       Rosana Mitchell MD

## 2022-05-02 ENCOUNTER — HOSPITAL ENCOUNTER (OUTPATIENT)
Age: 82
Discharge: HOME | End: 2022-05-02
Payer: MEDICARE

## 2022-05-02 DIAGNOSIS — E78.5 DYSLIPIDEMIA: ICD-10-CM

## 2022-05-02 DIAGNOSIS — E03.9 HYPOTHYROIDISM, UNSPECIFIED TYPE: ICD-10-CM

## 2022-05-02 DIAGNOSIS — D64.9 ANEMIA, UNSPECIFIED TYPE: ICD-10-CM

## 2022-05-02 LAB
BASOPHILS ABSOLUTE: 0 K/CU MM
BASOPHILS RELATIVE PERCENT: 0.9 % (ref 0–1)
CHOLESTEROL: 152 MG/DL
DIFFERENTIAL TYPE: ABNORMAL
EOSINOPHILS ABSOLUTE: 0.3 K/CU MM
EOSINOPHILS RELATIVE PERCENT: 5.9 % (ref 0–3)
HCT VFR BLD CALC: 36.5 % (ref 37–47)
HDLC SERPL-MCNC: 84 MG/DL
HEMOGLOBIN: 11.2 GM/DL (ref 12.5–16)
IMMATURE NEUTROPHIL %: 0.2 % (ref 0–0.43)
LDL CHOLESTEROL CALCULATED: 57 MG/DL
LYMPHOCYTES ABSOLUTE: 1.5 K/CU MM
LYMPHOCYTES RELATIVE PERCENT: 34 % (ref 24–44)
MCH RBC QN AUTO: 29.9 PG (ref 27–31)
MCHC RBC AUTO-ENTMCNC: 30.7 % (ref 32–36)
MCV RBC AUTO: 97.6 FL (ref 78–100)
MONOCYTES ABSOLUTE: 0.5 K/CU MM
MONOCYTES RELATIVE PERCENT: 11.3 % (ref 0–4)
NUCLEATED RBC %: 0 %
PDW BLD-RTO: 14 % (ref 11.7–14.9)
PLATELET # BLD: 187 K/CU MM (ref 140–440)
PMV BLD AUTO: 9.6 FL (ref 7.5–11.1)
RBC # BLD: 3.74 M/CU MM (ref 4.2–5.4)
SEGMENTED NEUTROPHILS ABSOLUTE COUNT: 2.1 K/CU MM
SEGMENTED NEUTROPHILS RELATIVE PERCENT: 47.7 % (ref 36–66)
TOTAL IMMATURE NEUTOROPHIL: 0.01 K/CU MM
TOTAL NUCLEATED RBC: 0 K/CU MM
TRIGL SERPL-MCNC: 53 MG/DL
TSH HIGH SENSITIVITY: 2.48 UIU/ML (ref 0.27–4.2)
WBC # BLD: 4.4 K/CU MM (ref 4–10.5)

## 2022-05-02 PROCEDURE — 36415 COLL VENOUS BLD VENIPUNCTURE: CPT

## 2022-05-02 PROCEDURE — 80061 LIPID PANEL: CPT

## 2022-05-02 PROCEDURE — 84443 ASSAY THYROID STIM HORMONE: CPT

## 2022-05-02 PROCEDURE — 85025 COMPLETE CBC W/AUTO DIFF WBC: CPT

## 2022-06-17 DIAGNOSIS — F51.01 PRIMARY INSOMNIA: ICD-10-CM

## 2022-06-20 RX ORDER — ZOLPIDEM TARTRATE 5 MG/1
5 TABLET ORAL NIGHTLY PRN
Qty: 30 TABLET | Refills: 2 | Status: SHIPPED | OUTPATIENT
Start: 2022-06-20 | End: 2022-08-22 | Stop reason: SDUPTHER

## 2022-07-22 RX ORDER — PANTOPRAZOLE SODIUM 40 MG/1
TABLET, DELAYED RELEASE ORAL
Qty: 90 TABLET | Refills: 3 | Status: SHIPPED | OUTPATIENT
Start: 2022-07-22

## 2022-08-05 DIAGNOSIS — E78.2 MIXED HYPERLIPIDEMIA: ICD-10-CM

## 2022-08-05 RX ORDER — ATORVASTATIN CALCIUM 20 MG/1
TABLET, FILM COATED ORAL
Qty: 90 TABLET | Refills: 3 | Status: SHIPPED | OUTPATIENT
Start: 2022-08-05

## 2022-08-22 ENCOUNTER — OFFICE VISIT (OUTPATIENT)
Dept: INTERNAL MEDICINE CLINIC | Age: 82
End: 2022-08-22
Payer: MEDICARE

## 2022-08-22 ENCOUNTER — TELEPHONE (OUTPATIENT)
Dept: INTERNAL MEDICINE CLINIC | Age: 82
End: 2022-08-22

## 2022-08-22 VITALS
DIASTOLIC BLOOD PRESSURE: 64 MMHG | BODY MASS INDEX: 31.1 KG/M2 | WEIGHT: 169 LBS | SYSTOLIC BLOOD PRESSURE: 118 MMHG | HEIGHT: 62 IN | HEART RATE: 59 BPM | OXYGEN SATURATION: 97 %

## 2022-08-22 DIAGNOSIS — F41.8 DEPRESSION WITH ANXIETY: Primary | ICD-10-CM

## 2022-08-22 DIAGNOSIS — E03.9 HYPOTHYROIDISM, UNSPECIFIED TYPE: ICD-10-CM

## 2022-08-22 DIAGNOSIS — F51.01 PRIMARY INSOMNIA: ICD-10-CM

## 2022-08-22 DIAGNOSIS — E78.5 DYSLIPIDEMIA: ICD-10-CM

## 2022-08-22 DIAGNOSIS — E66.9 OBESITY (BMI 30-39.9): ICD-10-CM

## 2022-08-22 DIAGNOSIS — D64.9 ANEMIA, UNSPECIFIED TYPE: ICD-10-CM

## 2022-08-22 DIAGNOSIS — I48.0 PAROXYSMAL ATRIAL FIBRILLATION (HCC): ICD-10-CM

## 2022-08-22 PROCEDURE — 1123F ACP DISCUSS/DSCN MKR DOCD: CPT | Performed by: FAMILY MEDICINE

## 2022-08-22 PROCEDURE — 99214 OFFICE O/P EST MOD 30 MIN: CPT | Performed by: FAMILY MEDICINE

## 2022-08-22 RX ORDER — ZOLPIDEM TARTRATE 5 MG/1
5 TABLET ORAL NIGHTLY PRN
Qty: 30 TABLET | Refills: 2 | Status: SHIPPED | OUTPATIENT
Start: 2022-08-22 | End: 2022-11-20

## 2022-08-22 SDOH — ECONOMIC STABILITY: FOOD INSECURITY: WITHIN THE PAST 12 MONTHS, YOU WORRIED THAT YOUR FOOD WOULD RUN OUT BEFORE YOU GOT MONEY TO BUY MORE.: NEVER TRUE

## 2022-08-22 SDOH — ECONOMIC STABILITY: FOOD INSECURITY: WITHIN THE PAST 12 MONTHS, THE FOOD YOU BOUGHT JUST DIDN'T LAST AND YOU DIDN'T HAVE MONEY TO GET MORE.: NEVER TRUE

## 2022-08-22 ASSESSMENT — ENCOUNTER SYMPTOMS
COLOR CHANGE: 0
SORE THROAT: 0
ABDOMINAL PAIN: 0
CHEST TIGHTNESS: 0
SHORTNESS OF BREATH: 0
VOMITING: 0
CONSTIPATION: 0
DIARRHEA: 0

## 2022-08-22 ASSESSMENT — SOCIAL DETERMINANTS OF HEALTH (SDOH): HOW HARD IS IT FOR YOU TO PAY FOR THE VERY BASICS LIKE FOOD, HOUSING, MEDICAL CARE, AND HEATING?: NOT VERY HARD

## 2022-08-22 NOTE — PROGRESS NOTES
Subjective:      Chief Complaint   Patient presents with    6 Month Follow-Up    Hypothyroidism         HPI:  Prerna Early is a 81 y.o. female who presents today for follow up of chronic conditions as listed below. Mood/insomnia:  Reports mood has been doing well on her medications. Ambien still working well for her. Anemia:  Not taking iron. Denies any symptoms of anemia. Hypothyroidism: Denying any symptoms. HTN:  BP's normal at home. Does still walk about 2 miles 5 times a week, but states she has been eating poorly. States she has gained all of the weight back that she lost last year. Knows what she needs to do, but just needs to motivate herself to do it. Feeling well today, no acute concerns. Past Medical History:   Diagnosis Date    Abnormal EKG     Adenomatous colon polyp 2014    Anxiety     Atrial fibrillation with RVR (HCC) 2018    Chronic anticoagulation 2018    A Fib; Xarelto    Chronic radicular lumbar pain 2013    Left; Lindsey Flies;  [place]    Depression with anxiety 2004    Eczema     History of echocardiogram 2020    EF 55-60%, Mild left ventricular hypertrophy, mildly dilated left atrium, aneurysmal interatrial septum, Mild MR, AR, TR, MO, No pericardial effusion    History of exercise stress test 2020    Treadmill, normal    History of Holter monitoring 2019    Blocked APCS    Hypothyroidism 2012    Insomnia     LBBB (left bundle branch block)     Osteoarthritis of thumb     base of thumbs bilateral    S/P ablation of atrial fibrillation 2018    S/P knee replacement     bilateral    SOB (shortness of breath)         Past Surgical History:   Procedure Laterality Date    ABLATION OF DYSRHYTHMIC FOCUS  2018    BREAST BIOPSY Bilateral     CATARACT REMOVAL WITH IMPLANT Bilateral      SECTION      X 3    TOTAL KNEE ARTHROPLASTY Right 2017    [place]    TOTAL KNEE ARTHROPLASTY Left 2018 Dr Tigre Wynne History     Tobacco Use    Smoking status: Never    Smokeless tobacco: Never   Substance Use Topics    Alcohol use: No     Comment: No caffeine        Review of Systems   Constitutional:  Negative for activity change, appetite change, chills, fever and unexpected weight change. HENT:  Negative for congestion and sore throat. Respiratory:  Negative for chest tightness and shortness of breath. Cardiovascular:  Negative for chest pain and palpitations. Gastrointestinal:  Negative for abdominal pain, constipation, diarrhea and vomiting. Genitourinary:  Negative for dysuria. Skin:  Negative for color change. Neurological:  Negative for dizziness and light-headedness. Psychiatric/Behavioral:  Negative for dysphoric mood. The patient is not nervous/anxious. Prior to Visit Medications    Medication Sig Taking? Authorizing Provider   atorvastatin (LIPITOR) 20 MG tablet TAKE 1 TABLET DAILY  Micha Red APRN - CNP   pantoprazole (PROTONIX) 40 MG tablet TAKE 1 TABLET DAILY  Micha Red, APRN - CNP   zolpidem (AMBIEN) 5 MG tablet Take 1 tablet by mouth nightly as needed for Sleep for up to 90 days. Fay Ramsey MD   levothyroxine (SYNTHROID) 88 MCG tablet TAKE 1 TABLET DAILY  Fay Ramsey MD   dilTIAZem (CARDIZEM CD) 120 MG extended release capsule Take 1 capsule by mouth daily  Merced Adrian MD   citalopram (CELEXA) 40 MG tablet TAKE 1 TABLET DAILY  Fay Ramsey MD   XARELTO 20 MG TABS tablet TAKE 1 TABLET DAILY  Fay Ramsey MD   Handicap Placard MISC by Does not apply route Good for 5 years  Fay Ramsey MD   albuterol sulfate HFA (PROVENTIL HFA) 108 (90 Base) MCG/ACT inhaler Inhale 2 puffs into the lungs every 6 hours as needed for Wheezing  Fay Ramsey MD   gabapentin (NEURONTIN) 300 MG capsule Take 1 capsule by mouth nightly for 180 days.   Fay Ramsey MD   Magnesium Oxide 250 MG TABS Take 1 tablet by mouth daily Sabra Lazar MD          Objective:      /64 (Site: Left Upper Arm, Position: Sitting, Cuff Size: Medium Adult)   Pulse 59   Ht 5' 2\" (1.575 m)   Wt 169 lb (76.7 kg)   SpO2 97%   BMI 30.91 kg/m²      Physical Exam  Vitals and nursing note reviewed. Constitutional:       General: She is not in acute distress. Appearance: Normal appearance. She is not ill-appearing or toxic-appearing. HENT:      Head: Normocephalic and atraumatic. Right Ear: External ear normal.      Left Ear: External ear normal.      Nose: Nose normal.      Mouth/Throat:      Pharynx: Oropharynx is clear. Eyes:      General: No scleral icterus. Right eye: No discharge. Left eye: No discharge. Extraocular Movements: Extraocular movements intact. Conjunctiva/sclera: Conjunctivae normal.   Cardiovascular:      Rate and Rhythm: Normal rate and regular rhythm. Heart sounds: Normal heart sounds. Pulmonary:      Effort: Pulmonary effort is normal.      Breath sounds: Normal breath sounds. No wheezing or rales. Musculoskeletal:         General: No deformity. Cervical back: Normal range of motion and neck supple. No rigidity. Skin:     General: Skin is warm and dry. Findings: No rash. Neurological:      General: No focal deficit present. Mental Status: She is alert. Mental status is at baseline. Motor: No weakness. Psychiatric:         Mood and Affect: Mood normal.         Behavior: Behavior normal.          Assessment / Plan:      1. Depression with anxiety  Stable, well controlled. Continue current medications. 2. Paroxysmal atrial fibrillation (HCC)  In NSR today, continue diltiazem and xarelto. Following with cardiology. 3. Hypothyroidism, unspecified type  Last TSH wnl. Will monitor labs, continue current dose of synthroid. - TSH; Future    4. Dyslipidemia  Continue lipitor.   - Comprehensive Metabolic Panel; Future  - Lipid Panel; Future    5.  Anemia, unspecified type  Hb stable, will continue to monitor.   - CBC with Auto Differential; Future    6. Primary insomnia  May continue Ambien.  - zolpidem (AMBIEN) 5 MG tablet; Take 1 tablet by mouth nightly as needed for Sleep for up to 90 days. Dispense: 30 tablet; Refill: 2     7. Obesity  Patient has regained weight she previously lost.  Discussed lifestyle modifications and option of weight loss clinic if she is interested. Patient states she will think about it and contact us if she wants referral.  Will continue to monitor. I have spent 30 minutes on this patient encounter. Patient voiced understanding and agreement with plan. All questions/concerns were addressed, risks/side effects of medications were reviewed. Return precautions and after visit summary were provided. Return in about 6 months (around 2/22/2023). or earlier as needed.       Bella Covarrubias MD

## 2022-08-22 NOTE — TELEPHONE ENCOUNTER
Called and spoke with pt and informed of 6 month follow up appt. Was unable to schedule pt for her follow up appt this date d/t computer issues.

## 2022-08-26 RX ORDER — RIVAROXABAN 20 MG/1
TABLET, FILM COATED ORAL
Qty: 90 TABLET | Refills: 3 | Status: SHIPPED | OUTPATIENT
Start: 2022-08-26

## 2022-09-19 RX ORDER — CITALOPRAM 40 MG/1
TABLET ORAL
Qty: 90 TABLET | Refills: 3 | Status: SHIPPED | OUTPATIENT
Start: 2022-09-19

## 2022-10-17 RX ORDER — DILTIAZEM HYDROCHLORIDE 120 MG/1
CAPSULE, COATED, EXTENDED RELEASE ORAL
Qty: 90 CAPSULE | Refills: 3 | OUTPATIENT
Start: 2022-10-17

## 2022-11-15 ENCOUNTER — OFFICE VISIT (OUTPATIENT)
Dept: CARDIOLOGY CLINIC | Age: 82
End: 2022-11-15
Payer: MEDICARE

## 2022-11-15 VITALS
WEIGHT: 168.2 LBS | HEIGHT: 62 IN | OXYGEN SATURATION: 96 % | BODY MASS INDEX: 30.95 KG/M2 | HEART RATE: 69 BPM | SYSTOLIC BLOOD PRESSURE: 108 MMHG | DIASTOLIC BLOOD PRESSURE: 60 MMHG

## 2022-11-15 DIAGNOSIS — E78.5 DYSLIPIDEMIA: ICD-10-CM

## 2022-11-15 DIAGNOSIS — Z98.890 S/P ABLATION OF ATRIAL FLUTTER: ICD-10-CM

## 2022-11-15 DIAGNOSIS — I35.1 AORTIC VALVE INSUFFICIENCY, ETIOLOGY OF CARDIAC VALVE DISEASE UNSPECIFIED: ICD-10-CM

## 2022-11-15 DIAGNOSIS — I48.0 PAROXYSMAL ATRIAL FIBRILLATION (HCC): Primary | ICD-10-CM

## 2022-11-15 DIAGNOSIS — Z86.79 S/P ABLATION OF ATRIAL FIBRILLATION: ICD-10-CM

## 2022-11-15 DIAGNOSIS — Z98.890 S/P ABLATION OF ATRIAL FIBRILLATION: ICD-10-CM

## 2022-11-15 DIAGNOSIS — Z86.79 S/P ABLATION OF ATRIAL FLUTTER: ICD-10-CM

## 2022-11-15 PROCEDURE — 93000 ELECTROCARDIOGRAM COMPLETE: CPT | Performed by: NURSE PRACTITIONER

## 2022-11-15 PROCEDURE — 1123F ACP DISCUSS/DSCN MKR DOCD: CPT | Performed by: NURSE PRACTITIONER

## 2022-11-15 PROCEDURE — 99214 OFFICE O/P EST MOD 30 MIN: CPT | Performed by: NURSE PRACTITIONER

## 2022-11-15 RX ORDER — DILTIAZEM HYDROCHLORIDE 120 MG/1
120 CAPSULE, COATED, EXTENDED RELEASE ORAL DAILY
Qty: 90 CAPSULE | Refills: 3 | Status: SHIPPED | OUTPATIENT
Start: 2022-11-15

## 2022-11-15 ASSESSMENT — ENCOUNTER SYMPTOMS: SHORTNESS OF BREATH: 1

## 2022-11-15 NOTE — PATIENT INSTRUCTIONS
Thank you for allowing us to care for you today! We want to ensure we can follow your treatment plan and we strive to give you the best outcomes and experience possible. If you ever have a life threatening emergency and call 911 - for an ambulance (EMS)   Our providers can only care for you at:   [facility] or [facility]. Even if you have someone take you or you drive yourself we can only care for you in a [facility]. Our providers are not setup at the other healthcare locations! Zaid Strange afstephen  **It is YOUR responsibilty to bring medication bottles and/or updated medication list to [address] [place] [address]. This will allow us to better serve you and all your healthcare needs**  [facility] Locations - No appointment necessary. Sites open Monday to Friday. Call your preferred location for test preparation, business hours and other information you need. [employer] accepts [employer]. [facility] [address]. [facility], [address] [facility]  Phone: [phone] [facility] [address] [facility]  [address]. [facility], [address] [facility]  Phone: [phone]   Please hold on to these instructions the  will call you within 1-9 business days when we receive authorization from your insurance. Echocardiogram    WHAT TO EXPECT:   This test will take approximately 45 minutes. It is an ultrasound of the heart. It can look at the valves and chambers inside the heart   There is no special instructions for this test.     If you are unable to keep this appointment, please notify us 24 hours prior to test at [phone].

## 2022-11-15 NOTE — PROGRESS NOTES
[facility] [facility],[address]  Phone: [phone]    Fax [phone]    Satinder Ford MD, Jose G Otero MD, Scooter Zaman MD, Jude How, MD Juanda Left, MD Melvia Binet, MD Cameron Marshall, MD Roma Burkitt, MEY Yin, MEY Tidwell, [facility], [facility]    CARDIOLOGY  NOTE    11/15/2022    Sheldon De Jesus (:  1940) is a 81 y.o. female,Established patient with Dr. Sameer Guajardo, here for evaluation of the following chief complaint(s):  6 Month Follow-Up (6 mo follow up. Pt states shortness of breath on exertion. States also moved furniture recently and thinks she may have pulled a muscle in back below shoulder blade.) and Shortness of Breath    SUBJECTIVE/OBJECTIVE:    HPI  Patient is her to follow up on the following:  Paroxysmal atrial fibrillation ([facility] [address]  (primary encounter diagnosis)  Dyslipidemia    Patient states that she is feeling well. She denies having any cardiac issues. Patient is a non-smoker. Patient denies issues obtaining or taking medications. Patient is active and does do organized exercise. Patient denies chest pain, shortness of breath, palpitations, dizziness, orthopnea, lower leg swelling, or syncope. Review of Systems   Constitutional:  Negative for fatigue. Respiratory:  Positive for shortness of breath. Cardiovascular:  Negative for palpitations. Musculoskeletal:  Negative for arthralgias and gait problem. Neurological:  Negative for dizziness, syncope, weakness and light-headedness.      Vitals:    11/15/22 1303   BP: 108/60   Site: Left Upper Arm   Position: Sitting   Cuff Size: Medium Adult   Pulse: 69   SpO2: 96%   Weight: 168 lb 3.2 oz (76.3 kg)   Height: 5' 2\" (1.575 m)       Wt Readings from Last 3 Encounters:   11/15/22 168 lb 3.2 oz (76.3 kg)   22 169 lb (76.7 kg)   22 163 lb (73.9 kg)       BP Readings from Last 3 Encounters: 11/15/22 108/60   08/22/22 118/64   02/22/22 124/66       Prior to Admission medications    Medication Sig Start Date End Date Taking? Authorizing Provider   citalopram (CELEXA) 40 MG tablet TAKE 1 TABLET DAILY 9/19/22  Yes Ivelisse Phoenix MD   XARELTO 20 MG TABS tablet TAKE 1 TABLET DAILY 8/26/22  Yes MEY Hernandez CNP   zolpidem (AMBIEN) 5 MG tablet Take 1 tablet by mouth nightly as needed for Sleep for up to 90 days. 8/22/22 11/20/22 Yes Ivelisse Phoenix MD   atorvastatin (LIPITOR) 20 MG tablet TAKE 1 TABLET DAILY 8/5/22  Yes MEY Hernandez CNP   pantoprazole (PROTONIX) 40 MG tablet TAKE 1 TABLET DAILY 7/22/22  Yes MEY Hernandez CNP   levothyroxine (SYNTHROID) 88 MCG tablet TAKE 1 TABLET DAILY 1/31/22  Yes Ivelisse Phoenix MD   dilTIAZem (CARDIZEM CD) 120 MG extended release capsule Take 1 capsule by mouth daily 10/21/21  Yes Susan Field MD   Handicap Placard MISC by Does not apply route Good for 5 years 4/19/21  Yes Ivelisse Phoenix MD   Magnesium Oxide 250 MG TABS Take 1 tablet by mouth daily 2/8/19  Yes Susan Field MD   albuterol sulfate HFA (PROVENTIL HFA) 108 (90 Base) MCG/ACT inhaler Inhale 2 puffs into the lungs every 6 hours as needed for Wheezing  Patient not taking: No sig reported 2/18/21   Ivelisse Phoenix MD       Physical Exam  Vitals reviewed. Constitutional:       General: She is not in acute distress. Appearance: Normal appearance. She is not ill-appearing. HENT:      Head: Atraumatic. Neck:      Vascular: No carotid bruit. Cardiovascular:      Rate and Rhythm: Normal rate and regular rhythm. Pulses: Normal pulses. Heart sounds: Murmur heard. Pulmonary:      Effort: Pulmonary effort is normal. No respiratory distress. Breath sounds: Normal breath sounds. Musculoskeletal:         General: No swelling or deformity. Cervical back: Neck supple. No muscular tenderness. Neurological:      Mental Status: She is alert. Health Maintenance   Topic Date Due    DTaP/Tdap/Td vaccine (1 - Tdap) 06/28/2016    Annual Wellness Visit (AWV)  Never done    Depression Monitoring  02/22/2023    Lipids  05/02/2023    DEXA (modify frequency per FRAX score)  Completed    Flu vaccine  Completed    Shingles vaccine  Completed    Pneumococcal 65+ years Vaccine  Completed    COVID-19 Vaccine  Completed    Hepatitis A vaccine  Aged Out    Hib vaccine  Aged Out    Meningococcal (ACWY) vaccine  Aged Out           ASSESSMENT/PLAN:  Fatigue  Echo showed mild AR PHT of 867- mild systolic murmur noted on examination  Will check echo     Paroxysmal atrial fibrillation ([address]  Assessment & Plan:  Rate controlled yes. Post afib/flutter ablation 2018 by Dr. Shirlene Lund score is 3  She is on anticoagulation. Continue Cardizem   No tobacco, caffeine, or alcohol. [id number] Score for Atrial Fibrillation Stroke Risk    Risk   Factors   Component Value   C CHF No 0   H HTN No 0   A2 Age >= 76 Yes,  (76 y.o.) 2   D DM No 0   S2 Prior Stroke/TIA No 0   V Vascular Disease No 0   A Age 74-69 No,  (76 y.o.) 0   Sc Sex female 1     [id number]  Score   3   Score last updated 5/6/21 2:81 PM EDT     Click here for a link to the UpToDate guideline \"Atrial Fibrillation: Anticoagulation therapy to prevent embolization     Disclaimer: Risk Score calculation is dependent on accuracy of patient problem list and past encounter diagnosis. 2. Dyslipidemia  Assessment & Plan:  Lipid panel reviewed from 1/2021  Patient LDL is  at goal, HDL is at goal, triglycerides normal  Continue lipitor to 10 mg daily  Discussed with the patient the need for exercise, low cholesterol diet, and compliance with medications. Latest Reference Range & Units 5/2/22 08:04   Cholesterol <200 MG/   HDL Cholesterol >40 MG/DL 84   LDL Calculated <100 MG/DL 57   Triglycerides <150 MG/DL 53           No follow-ups on file.       An electronic signature was used to authenticate this note.    Electronically signed by MEY Lynch CNP on 11/15/2022 at 1:14 PM

## 2022-11-16 DIAGNOSIS — F51.01 PRIMARY INSOMNIA: ICD-10-CM

## 2022-11-17 RX ORDER — ZOLPIDEM TARTRATE 5 MG/1
TABLET ORAL
Qty: 30 TABLET | Refills: 2 | Status: SHIPPED | OUTPATIENT
Start: 2022-11-17 | End: 2023-02-15

## 2022-12-08 NOTE — PROGRESS NOTES
History:   Procedure Laterality Date    ABLATION OF DYSRHYTHMIC FOCUS  2018    BREAST BIOPSY Bilateral      SECTION      X 3    TOTAL KNEE ARTHROPLASTY Right 2017    Rosa Scales TOTAL KNEE ARTHROPLASTY Left 2018    Dr Maira Moon       Family history: No sudden cardiac death    Social history :  reports that she has never smoked. She has never used smokeless tobacco. She reports that she does not drink alcohol. No Known Allergies    Current Outpatient Prescriptions on File Prior to Visit   Medication Sig Dispense Refill    rivaroxaban (XARELTO) 20 MG TABS tablet Take 1 tablet by mouth daily 90 tablet 3    pantoprazole (PROTONIX) 40 MG tablet Take 1 tablet by mouth daily 90 tablet 3    albuterol sulfate HFA (PROVENTIL HFA) 108 (90 Base) MCG/ACT inhaler Inhale 2 puffs into the lungs every 6 hours as needed for Wheezing 1 Inhaler 3    budesonide (PULMICORT FLEXHALER) 90 MCG/ACT AEPB inhaler Inhale 2 puffs into the lungs 2 times daily 1 each 2    propafenone (RYTHMOL) 150 MG tablet Take 1 tablet by mouth every 8 hours 90 tablet 3    levothyroxine (SYNTHROID) 88 MCG tablet TAKE 1 TABLET DAILY 90 tablet 1    gabapentin (NEURONTIN) 300 MG capsule Take 1 capsule by mouth nightly. 30 capsule 5    Multiple Vitamins-Iron (MULTIVITAMIN/IRON PO) Take 1 tablet by mouth daily. No current facility-administered medications on file prior to visit. Review of Systems:   Review of Systems   Constitutional:. Negative for chills and fever. HENT: Negative for congestion, ear pain and tinnitus. Eyes: Negative for photophobia, pain and visual disturbance. Respiratory: denies SOB. Negative for cough, chest tightness and wheezing. Cardiovascular: Denies palpitations. Negative for chest pain and leg swelling. Gastrointestinal: Negative for abdominal pain, blood in stool, constipation, diarrhea, nausea and vomiting. Endocrine: Negative for cold intolerance and heat intolerance. Genitourinary: Negative for dysuria, flank pain and hematuria. Musculoskeletal: Positive for arthralgias and back pain. Negative for myalgias and neck stiffness. Skin: Negative for color change and rash. Allergic/Immunologic: Negative for food allergies. Neurological: Negative for  syncope, numbness and headaches. occasional dizziness  Hematological: Does not bruise/bleed easily. Psychiatric/Behavioral: Negative for agitation and confusion. Physical Examination:    /68   Pulse 65   Ht 5' 3\" (1.6 m)   Wt 162 lb 9.6 oz (73.8 kg)   BMI 28.80 kg/m²    Wt Readings from Last 3 Encounters:   09/07/18 162 lb 9.6 oz (73.8 kg)   08/27/18 155 lb (70.3 kg)   08/21/18 172 lb 2.9 oz (78.1 kg)     Body mass index is 28.8 kg/m². Physical Exam   Constitutional: She is oriented to person, place, and time and well-developed, well-nourished, and in no distress. HENT:   Head: Normocephalic and atraumatic. Eyes: Conjunctivae and EOM are normal. Pupils are equal, round, and reactive to light. Right eye exhibits no discharge. Neck: Normal range of motion. No JVD present. No thyromegaly present. Cardiovascular: Normal rate, regular rhythm and normal heart sounds. Exam reveals no friction rub. No murmur heard. Pulmonary/Chest: Effort normal and breath sounds normal. No stridor. No respiratory distress. She has no wheezes. Abdominal: Soft. Bowel sounds are normal. She exhibits no distension. There is no tenderness. Musculoskeletal: Normal range of motion. She exhibits no edema or tenderness. Neurological: She is alert and oriented to person, place, and time. No cranial nerve deficit. Skin: Skin is warm and dry. No rash noted. No erythema.    Psychiatric: Mood and affect normal.         CBC:   Lab Results   Component Value Date    WBC 8.0 08/21/2018    HGB 10.5 08/21/2018    HCT 33.6 08/21/2018     08/21/2018     Lipids:   Lab Results   Component Value Date    CHOL 178 07/19/2016 V-Y Flap Text: The defect edges were debeveled with a #15 scalpel blade.  Given the location of the defect, shape of the defect and the proximity to free margins a V-Y flap was deemed most appropriate.  Using a sterile surgical marker, an appropriate advancement flap was drawn incorporating the defect and placing the expected incisions within the relaxed skin tension lines where possible.    The area thus outlined was incised deep to adipose tissue with a #15 scalpel blade.  The skin margins were undermined to an appropriate distance in all directions utilizing iris scissors.

## 2022-12-20 ENCOUNTER — PROCEDURE VISIT (OUTPATIENT)
Dept: CARDIOLOGY CLINIC | Age: 82
End: 2022-12-20
Payer: MEDICARE

## 2022-12-20 DIAGNOSIS — I35.1 AORTIC VALVE INSUFFICIENCY, ETIOLOGY OF CARDIAC VALVE DISEASE UNSPECIFIED: ICD-10-CM

## 2022-12-20 PROCEDURE — 93306 TTE W/DOPPLER COMPLETE: CPT | Performed by: INTERNAL MEDICINE

## 2022-12-27 ENCOUNTER — TELEPHONE (OUTPATIENT)
Dept: CARDIOLOGY CLINIC | Age: 82
End: 2022-12-27

## 2023-01-03 ENCOUNTER — TELEPHONE (OUTPATIENT)
Dept: INTERNAL MEDICINE CLINIC | Age: 83
End: 2023-01-03

## 2023-01-03 DIAGNOSIS — U07.1 COVID-19: Primary | ICD-10-CM

## 2023-01-19 ENCOUNTER — PATIENT MESSAGE (OUTPATIENT)
Dept: INTERNAL MEDICINE CLINIC | Age: 83
End: 2023-01-19

## 2023-01-19 DIAGNOSIS — J01.90 ACUTE NON-RECURRENT SINUSITIS, UNSPECIFIED LOCATION: Primary | ICD-10-CM

## 2023-01-19 RX ORDER — AZITHROMYCIN 250 MG/1
250 TABLET, FILM COATED ORAL SEE ADMIN INSTRUCTIONS
Qty: 6 TABLET | Refills: 0 | Status: SHIPPED | OUTPATIENT
Start: 2023-01-19 | End: 2023-01-24

## 2023-01-19 RX ORDER — GUAIFENESIN 600 MG/1
600 TABLET, EXTENDED RELEASE ORAL 2 TIMES DAILY
Qty: 30 TABLET | Refills: 0 | Status: SHIPPED | OUTPATIENT
Start: 2023-01-19 | End: 2023-02-03

## 2023-01-19 NOTE — TELEPHONE ENCOUNTER
I sent in the requested medication and mucinex for both her and her . If they do not improve they will need to come to the office and be seen. Thank you.

## 2023-01-19 NOTE — TELEPHONE ENCOUNTER
From: Raphael Sethi  To: Dr. Syed Sanchez: 1/19/2023 8:18 AM EST  Subject: Possible sinus infection     Dr Joshua Harden, my  Pk Wilburn & I have had Covid, we now have lingering nasal congestion, sneezing, & sinus drainage. Could you please call in a Zpak for us at [facility].  Thanks, Alyce Ochoa

## 2023-01-19 NOTE — TELEPHONE ENCOUNTER
Called and spoke with pt. She states her and her  were Covid Positive around [place] time. They are having lingering sx's previously mentioned in her mychart note. Please advise.

## 2023-01-26 DIAGNOSIS — E03.9 HYPOTHYROIDISM, UNSPECIFIED TYPE: ICD-10-CM

## 2023-01-26 RX ORDER — LEVOTHYROXINE SODIUM 88 UG/1
TABLET ORAL
Qty: 90 TABLET | Refills: 3 | Status: SHIPPED | OUTPATIENT
Start: 2023-01-26

## 2023-01-30 DIAGNOSIS — F51.01 PRIMARY INSOMNIA: ICD-10-CM

## 2023-01-30 RX ORDER — ZOLPIDEM TARTRATE 5 MG/1
5 TABLET ORAL NIGHTLY PRN
Qty: 30 TABLET | Refills: 2 | Status: SHIPPED | OUTPATIENT
Start: 2023-01-30 | End: 2023-04-30

## 2023-03-31 ENCOUNTER — HOSPITAL ENCOUNTER (OUTPATIENT)
Age: 83
Discharge: HOME | End: 2023-03-31
Payer: MEDICARE

## 2023-03-31 DIAGNOSIS — E78.5 DYSLIPIDEMIA: ICD-10-CM

## 2023-03-31 DIAGNOSIS — D64.9 ANEMIA, UNSPECIFIED TYPE: ICD-10-CM

## 2023-03-31 DIAGNOSIS — E03.9 HYPOTHYROIDISM, UNSPECIFIED TYPE: ICD-10-CM

## 2023-03-31 LAB
ALBUMIN SERPL-MCNC: 4 GM/DL (ref 3.4–5)
ALP BLD-CCNC: 97 IU/L (ref 40–128)
ALT SERPL-CCNC: 8 U/L (ref 10–40)
ANION GAP SERPL CALCULATED.3IONS-SCNC: 6 MMOL/L (ref 4–16)
AST SERPL-CCNC: 14 IU/L (ref 15–37)
BASOPHILS ABSOLUTE: 0 K/CU MM
BASOPHILS RELATIVE PERCENT: 0.8 % (ref 0–1)
BILIRUB SERPL-MCNC: 0.4 MG/DL (ref 0–1)
BUN SERPL-MCNC: 15 MG/DL (ref 6–23)
CALCIUM SERPL-MCNC: 8.9 MG/DL (ref 8.3–10.6)
CHLORIDE BLD-SCNC: 104 MMOL/L (ref 99–110)
CHOLEST SERPL-MCNC: 151 MG/DL
CO2: 28 MMOL/L (ref 21–32)
CREAT SERPL-MCNC: 0.6 MG/DL (ref 0.6–1.1)
DIFFERENTIAL TYPE: ABNORMAL
EOSINOPHILS ABSOLUTE: 0.2 K/CU MM
EOSINOPHILS RELATIVE PERCENT: 3.9 % (ref 0–3)
GFR SERPL CREATININE-BSD FRML MDRD: >60 ML/MIN/1.73M2
GLUCOSE SERPL-MCNC: 88 MG/DL (ref 70–99)
HCT VFR BLD CALC: 37.7 % (ref 37–47)
HDLC SERPL-MCNC: 82 MG/DL
HEMOGLOBIN: 11.7 GM/DL (ref 12.5–16)
IMMATURE NEUTROPHIL %: 0.2 % (ref 0–0.43)
LDLC SERPL CALC-MCNC: 57 MG/DL
LYMPHOCYTES ABSOLUTE: 1.8 K/CU MM
LYMPHOCYTES RELATIVE PERCENT: 36.7 % (ref 24–44)
MCH RBC QN AUTO: 30.1 PG (ref 27–31)
MCHC RBC AUTO-ENTMCNC: 31 % (ref 32–36)
MCV RBC AUTO: 96.9 FL (ref 78–100)
MONOCYTES ABSOLUTE: 0.4 K/CU MM
MONOCYTES RELATIVE PERCENT: 8.4 % (ref 0–4)
NUCLEATED RBC %: 0 %
PDW BLD-RTO: 13.7 % (ref 11.7–14.9)
PLATELET # BLD: 212 K/CU MM (ref 140–440)
PMV BLD AUTO: 9.8 FL (ref 7.5–11.1)
POTASSIUM SERPL-SCNC: 4.8 MMOL/L (ref 3.5–5.1)
RBC # BLD: 3.89 M/CU MM (ref 4.2–5.4)
SEGMENTED NEUTROPHILS ABSOLUTE COUNT: 2.4 K/CU MM
SEGMENTED NEUTROPHILS RELATIVE PERCENT: 50 % (ref 36–66)
SODIUM BLD-SCNC: 138 MMOL/L (ref 135–145)
TOTAL IMMATURE NEUTOROPHIL: 0.01 K/CU MM
TOTAL NUCLEATED RBC: 0 K/CU MM
TOTAL PROTEIN: 6.4 GM/DL (ref 6.4–8.2)
TRIGL SERPL-MCNC: 58 MG/DL
TSH SERPL DL<=0.005 MIU/L-ACNC: 2.77 UIU/ML (ref 0.27–4.2)
WBC # BLD: 4.9 K/CU MM (ref 4–10.5)

## 2023-03-31 PROCEDURE — 85025 COMPLETE CBC W/AUTO DIFF WBC: CPT

## 2023-03-31 PROCEDURE — 36415 COLL VENOUS BLD VENIPUNCTURE: CPT

## 2023-03-31 PROCEDURE — 80053 COMPREHEN METABOLIC PANEL: CPT

## 2023-03-31 PROCEDURE — 84443 ASSAY THYROID STIM HORMONE: CPT

## 2023-03-31 PROCEDURE — 80061 LIPID PANEL: CPT

## 2023-04-10 PROBLEM — Z79.899 MEDICATION MANAGEMENT: Status: RESOLVED | Noted: 2018-05-22 | Resolved: 2023-04-10

## 2023-05-16 ENCOUNTER — OFFICE VISIT (OUTPATIENT)
Dept: CARDIOLOGY CLINIC | Age: 83
End: 2023-05-16

## 2023-05-16 VITALS
HEART RATE: 68 BPM | DIASTOLIC BLOOD PRESSURE: 76 MMHG | SYSTOLIC BLOOD PRESSURE: 128 MMHG | BODY MASS INDEX: 32.24 KG/M2 | HEIGHT: 62 IN | WEIGHT: 175.2 LBS

## 2023-05-16 DIAGNOSIS — I48.0 PAROXYSMAL ATRIAL FIBRILLATION (HCC): Primary | ICD-10-CM

## 2023-05-16 PROCEDURE — 1123F ACP DISCUSS/DSCN MKR DOCD: CPT | Performed by: INTERNAL MEDICINE

## 2023-05-16 PROCEDURE — 99214 OFFICE O/P EST MOD 30 MIN: CPT | Performed by: INTERNAL MEDICINE

## 2023-05-16 NOTE — PROGRESS NOTES
CARDIOLOGY NOTE      5/16/2023    RE: Araceli De Souza  (1940)                               TO:  Dr. Dannie Garrett MD            Tootie Ocampo is a 81 y.o. female who was seen today for management of  A fib                                    HPI:                   The patient does have cardiac complaints of mild exertional recurrent SOB, has no CP  Patient also seen  for    - Atrial fibrillation, pt is  compliant with meds. Patient does not have symptoms from atrial fibrillation  - Hyperlipidimea, importance of hyperlipidimea discussed with pt. Araceli De Souza has the following history recorded in care path:  Patient Active Problem List    Diagnosis Date Noted    S/P ablation of atrial fibrillation     S/P ablation of atrial flutter 08/20/2018    Paroxysmal atrial fibrillation ([facility] [address] 04/18/2018    Chronic anticoagulation 03/01/2018    Chronic radicular lumbar pain 09/01/2013    Eczema     Anemia 08/17/2021    Dyslipidemia 05/06/2021    LBBB (left bundle branch block)     Abnormal EKG     SOB (shortness of breath)     Mild intermittent asthma 08/18/2020    Hypothyroidism 07/01/2012    Depression with anxiety 01/01/2004     Current Outpatient Medications   Medication Sig Dispense Refill    zolpidem (AMBIEN) 5 MG tablet Take 1 tablet by mouth nightly as needed for Sleep for up to 90 days.  Max Daily Amount: 5 mg 30 tablet 2    buPROPion (WELLBUTRIN XL) 150 MG extended release tablet Take 1 tablet by mouth every morning 30 tablet 2    levothyroxine (SYNTHROID) 88 MCG tablet TAKE 1 TABLET DAILY 90 tablet 3    dilTIAZem (CARDIZEM CD) 120 MG extended release capsule Take 1 capsule by mouth daily 90 capsule 3    citalopram (CELEXA) 40 MG tablet TAKE 1 TABLET DAILY 90 tablet 3    XARELTO 20 MG TABS tablet TAKE 1 TABLET DAILY 90 tablet 3    atorvastatin (LIPITOR) 20 MG tablet TAKE 1 TABLET DAILY 90 tablet 3    pantoprazole (PROTONIX) 40 MG tablet TAKE 1 TABLET DAILY 90 tablet 3

## 2023-05-23 DIAGNOSIS — F41.8 DEPRESSION WITH ANXIETY: ICD-10-CM

## 2023-05-23 RX ORDER — BUPROPION HYDROCHLORIDE 150 MG/1
150 TABLET ORAL EVERY MORNING
Qty: 90 TABLET | Refills: 1 | Status: SHIPPED | OUTPATIENT
Start: 2023-05-23

## 2023-06-21 DIAGNOSIS — J45.20 MILD INTERMITTENT ASTHMA, UNSPECIFIED WHETHER COMPLICATED: ICD-10-CM

## 2023-06-21 DIAGNOSIS — F41.8 DEPRESSION WITH ANXIETY: ICD-10-CM

## 2023-06-21 RX ORDER — ALBUTEROL SULFATE 90 UG/1
2 AEROSOL, METERED RESPIRATORY (INHALATION) EVERY 6 HOURS PRN
Qty: 1 EACH | Refills: 3 | Status: SHIPPED | OUTPATIENT
Start: 2023-06-21

## 2023-06-21 RX ORDER — BUPROPION HYDROCHLORIDE 150 MG/1
150 TABLET ORAL EVERY MORNING
Qty: 90 TABLET | Refills: 1 | Status: SHIPPED | OUTPATIENT
Start: 2023-06-21

## 2023-07-17 RX ORDER — PANTOPRAZOLE SODIUM 40 MG/1
TABLET, DELAYED RELEASE ORAL
Qty: 90 TABLET | Refills: 3 | Status: SHIPPED | OUTPATIENT
Start: 2023-07-17

## 2023-07-31 DIAGNOSIS — E78.2 MIXED HYPERLIPIDEMIA: ICD-10-CM

## 2023-07-31 RX ORDER — ATORVASTATIN CALCIUM 20 MG/1
TABLET, FILM COATED ORAL
Qty: 90 TABLET | Refills: 3 | Status: SHIPPED | OUTPATIENT
Start: 2023-07-31

## 2023-08-07 RX ORDER — NYSTATIN 100000 U/G
CREAM TOPICAL
Qty: 30 G | Refills: 1 | Status: SHIPPED | OUTPATIENT
Start: 2023-08-07

## 2023-08-21 RX ORDER — RIVAROXABAN 20 MG/1
TABLET, FILM COATED ORAL
Qty: 90 TABLET | Refills: 3 | Status: SHIPPED | OUTPATIENT
Start: 2023-08-21

## 2023-08-25 DIAGNOSIS — F51.01 PRIMARY INSOMNIA: ICD-10-CM

## 2023-08-25 RX ORDER — ZOLPIDEM TARTRATE 5 MG/1
TABLET ORAL
Qty: 30 TABLET | Refills: 0 | Status: SHIPPED | OUTPATIENT
Start: 2023-08-25 | End: 2023-09-24

## 2023-09-12 RX ORDER — CITALOPRAM 40 MG/1
TABLET ORAL
Qty: 90 TABLET | Refills: 3 | Status: SHIPPED | OUTPATIENT
Start: 2023-09-12

## 2023-09-14 DIAGNOSIS — F41.8 DEPRESSION WITH ANXIETY: ICD-10-CM

## 2023-09-14 RX ORDER — BUPROPION HYDROCHLORIDE 150 MG/1
150 TABLET ORAL EVERY MORNING
Qty: 90 TABLET | Refills: 1 | Status: SHIPPED | OUTPATIENT
Start: 2023-09-14

## 2023-09-14 NOTE — TELEPHONE ENCOUNTER
----- Message from Peg Belvins sent at 9/14/2023  8:24 AM EDT -----  Regarding: Prescription refills   Contact: [phone]  Refill Wellbutrin order

## 2023-09-15 ENCOUNTER — OFFICE VISIT (OUTPATIENT)
Dept: CARDIOLOGY CLINIC | Age: 83
End: 2023-09-15
Payer: MEDICARE

## 2023-09-15 VITALS
BODY MASS INDEX: 31.4 KG/M2 | WEIGHT: 177.2 LBS | DIASTOLIC BLOOD PRESSURE: 80 MMHG | SYSTOLIC BLOOD PRESSURE: 124 MMHG | HEART RATE: 72 BPM | HEIGHT: 63 IN

## 2023-09-15 DIAGNOSIS — I48.0 PAROXYSMAL ATRIAL FIBRILLATION (HCC): Primary | ICD-10-CM

## 2023-09-15 DIAGNOSIS — D68.69 SECONDARY HYPERCOAGULABLE STATE (HCC): ICD-10-CM

## 2023-09-15 PROCEDURE — 1123F ACP DISCUSS/DSCN MKR DOCD: CPT | Performed by: NURSE PRACTITIONER

## 2023-09-15 PROCEDURE — 99214 OFFICE O/P EST MOD 30 MIN: CPT | Performed by: NURSE PRACTITIONER

## 2023-09-15 ASSESSMENT — ENCOUNTER SYMPTOMS
SHORTNESS OF BREATH: 0
ORTHOPNEA: 0

## 2023-10-17 DIAGNOSIS — F41.8 DEPRESSION WITH ANXIETY: ICD-10-CM

## 2023-10-17 DIAGNOSIS — F51.01 PRIMARY INSOMNIA: ICD-10-CM

## 2023-10-17 RX ORDER — BUPROPION HYDROCHLORIDE 150 MG/1
150 TABLET ORAL EVERY MORNING
Qty: 90 TABLET | Refills: 1 | Status: SHIPPED | OUTPATIENT
Start: 2023-10-17

## 2023-10-17 RX ORDER — ZOLPIDEM TARTRATE 5 MG/1
5 TABLET ORAL NIGHTLY PRN
Qty: 30 TABLET | Refills: 0 | Status: SHIPPED | OUTPATIENT
Start: 2023-10-17 | End: 2023-11-16

## 2023-10-17 NOTE — TELEPHONE ENCOUNTER
----- Message from Sheila Blevins sent at 10/17/2023  1:07 PM EDT -----  Regarding: Prescription refills   Contact: [phone]  Please refill my Ambien prescription at [facility].  Thank you

## 2023-10-23 RX ORDER — DILTIAZEM HYDROCHLORIDE 120 MG/1
120 CAPSULE, COATED, EXTENDED RELEASE ORAL DAILY
Qty: 90 CAPSULE | Refills: 3 | Status: SHIPPED | OUTPATIENT
Start: 2023-10-23

## 2023-11-13 ENCOUNTER — OFFICE VISIT (OUTPATIENT)
Dept: INTERNAL MEDICINE CLINIC | Age: 83
End: 2023-11-13
Payer: MEDICARE

## 2023-11-13 VITALS
SYSTOLIC BLOOD PRESSURE: 122 MMHG | OXYGEN SATURATION: 98 % | HEIGHT: 63 IN | WEIGHT: 175 LBS | DIASTOLIC BLOOD PRESSURE: 80 MMHG | HEART RATE: 75 BPM | BODY MASS INDEX: 31.01 KG/M2

## 2023-11-13 DIAGNOSIS — F41.8 DEPRESSION WITH ANXIETY: ICD-10-CM

## 2023-11-13 DIAGNOSIS — E78.2 MIXED HYPERLIPIDEMIA: Primary | ICD-10-CM

## 2023-11-13 DIAGNOSIS — E03.9 HYPOTHYROIDISM, UNSPECIFIED TYPE: ICD-10-CM

## 2023-11-13 DIAGNOSIS — E66.9 OBESITY (BMI 30-39.9): ICD-10-CM

## 2023-11-13 DIAGNOSIS — D64.9 ANEMIA, UNSPECIFIED TYPE: ICD-10-CM

## 2023-11-13 PROCEDURE — 99215 OFFICE O/P EST HI 40 MIN: CPT | Performed by: FAMILY MEDICINE

## 2023-11-13 PROCEDURE — 1123F ACP DISCUSS/DSCN MKR DOCD: CPT | Performed by: FAMILY MEDICINE

## 2023-11-13 ASSESSMENT — ENCOUNTER SYMPTOMS
DIARRHEA: 0
CHEST TIGHTNESS: 0
CONSTIPATION: 0
VOMITING: 0
ABDOMINAL PAIN: 0
SORE THROAT: 0
COLOR CHANGE: 0
SHORTNESS OF BREATH: 0

## 2023-11-13 NOTE — PROGRESS NOTES
Subjective:      Chief Complaint   Patient presents with    Follow-up     2 months       HPI:  Trisha Hernandez is a 81 y.o. female who presents today for follow up of chronic conditions as listed below. Patient concerned about weight. Has not gained weight since her last appointment, but states this is the heaviest she has ever been (even including her pregnancies) and it is affecting her quality of life. States she does not feel comfortable in her own skin, does not want to go out, socialize, etc.     Has tried lifestyle modifications/diets on her own without success. Would like to try wegovy -is willing to pay out of pocket. States she also discontinued lipitor about a month ago. Was not having any issues with side effects but felt she was taking too many medications and wanted to stop this one. Anemia:  Not taking iron. Denies any symptoms of anemia. Hypothyroidism: Denying any symptoms other than mood. HTN:  BP's normal at home. Past Medical History:   Diagnosis Date    Abnormal EKG     Adenomatous colon polyp 11/2014    Anxiety     Atrial fibrillation with RVR (HCC) 03/18/2018    Chronic anticoagulation 03/2018    A Fib; Xarelto    Chronic radicular lumbar pain 9/2013    Left; Yarelyin Ping;  Sujatha    Depression with anxiety 2004    Eczema     History of echocardiogram 11/23/2020    EF 55-60%, Mild left ventricular hypertrophy, mildly dilated left atrium, aneurysmal interatrial septum, Mild MR, AR, TR, WA, No pericardial effusion    History of exercise stress test 11/23/2020    Treadmill, normal    History of Holter monitoring 01/11/2019    Blocked APCS    Hypothyroidism 07/2012    Insomnia     LBBB (left bundle branch block)     Osteoarthritis of thumb     base of thumbs bilateral    S/P ablation of atrial fibrillation 08/2018    S/P knee replacement     bilateral    SOB (shortness of breath)         Past Surgical History:   Procedure Laterality Date    ABLATION

## 2023-11-28 ENCOUNTER — TELEPHONE (OUTPATIENT)
Dept: INTERNAL MEDICINE CLINIC | Age: 83
End: 2023-11-28

## 2023-11-28 DIAGNOSIS — M54.40 ACUTE LOW BACK PAIN WITH SCIATICA, SCIATICA LATERALITY UNSPECIFIED, UNSPECIFIED BACK PAIN LATERALITY: Primary | ICD-10-CM

## 2023-11-28 NOTE — TELEPHONE ENCOUNTER
I recently put her on a muscle relaxant for this purpose. I can order an XR- please notify patient that insurance is requiring. Thanks.

## 2023-11-28 NOTE — TELEPHONE ENCOUNTER
Dr. Berhane Shah office called in regards to pain clinic referral.they are boooked til the end of christmas & she wont be able to  be seen until then, & because of insurance purposes, they are needing her to be on some kind of Conservative treatment for 4 weeks. Some kind of pain medication.      They are also needing her to get an US or xray of her back as well

## 2023-11-29 ENCOUNTER — HOSPITAL ENCOUNTER (OUTPATIENT)
Age: 83
Discharge: HOME | End: 2023-11-29
Payer: MEDICARE

## 2023-11-29 ENCOUNTER — HOSPITAL ENCOUNTER (OUTPATIENT)
Dept: GENERAL RADIOLOGY | Age: 83
Discharge: HOME | End: 2023-11-29
Payer: MEDICARE

## 2023-11-29 DIAGNOSIS — M54.40 ACUTE LOW BACK PAIN WITH SCIATICA, SCIATICA LATERALITY UNSPECIFIED, UNSPECIFIED BACK PAIN LATERALITY: ICD-10-CM

## 2023-11-29 PROCEDURE — 72100 X-RAY EXAM L-S SPINE 2/3 VWS: CPT

## 2024-02-13 ENCOUNTER — OFFICE VISIT (OUTPATIENT)
Dept: INTERNAL MEDICINE CLINIC | Age: 84
End: 2024-02-13
Payer: MEDICARE

## 2024-02-13 VITALS
HEART RATE: 73 BPM | SYSTOLIC BLOOD PRESSURE: 122 MMHG | OXYGEN SATURATION: 93 % | HEIGHT: 63 IN | WEIGHT: 175 LBS | BODY MASS INDEX: 31.01 KG/M2 | DIASTOLIC BLOOD PRESSURE: 70 MMHG

## 2024-02-13 DIAGNOSIS — D68.69 SECONDARY HYPERCOAGULABLE STATE (HCC): ICD-10-CM

## 2024-02-13 DIAGNOSIS — M54.40 ACUTE LOW BACK PAIN WITH SCIATICA, SCIATICA LATERALITY UNSPECIFIED, UNSPECIFIED BACK PAIN LATERALITY: ICD-10-CM

## 2024-02-13 DIAGNOSIS — I48.0 PAROXYSMAL ATRIAL FIBRILLATION (HCC): ICD-10-CM

## 2024-02-13 DIAGNOSIS — J06.9 UPPER RESPIRATORY TRACT INFECTION, UNSPECIFIED TYPE: ICD-10-CM

## 2024-02-13 DIAGNOSIS — E03.9 HYPOTHYROIDISM, UNSPECIFIED TYPE: ICD-10-CM

## 2024-02-13 DIAGNOSIS — J45.20 MILD INTERMITTENT ASTHMA, UNSPECIFIED WHETHER COMPLICATED: ICD-10-CM

## 2024-02-13 DIAGNOSIS — F41.8 DEPRESSION WITH ANXIETY: ICD-10-CM

## 2024-02-13 DIAGNOSIS — D64.9 ANEMIA, UNSPECIFIED TYPE: ICD-10-CM

## 2024-02-13 DIAGNOSIS — E78.2 MIXED HYPERLIPIDEMIA: Primary | ICD-10-CM

## 2024-02-13 PROCEDURE — 1123F ACP DISCUSS/DSCN MKR DOCD: CPT | Performed by: FAMILY MEDICINE

## 2024-02-13 PROCEDURE — 99215 OFFICE O/P EST HI 40 MIN: CPT | Performed by: FAMILY MEDICINE

## 2024-02-13 RX ORDER — LEVOTHYROXINE SODIUM 88 UG/1
88 TABLET ORAL DAILY
Qty: 90 TABLET | Refills: 3 | Status: SHIPPED | OUTPATIENT
Start: 2024-02-13

## 2024-02-13 RX ORDER — DILTIAZEM HYDROCHLORIDE 120 MG/1
120 CAPSULE, COATED, EXTENDED RELEASE ORAL DAILY
Qty: 90 CAPSULE | Refills: 3 | Status: SHIPPED | OUTPATIENT
Start: 2024-02-13

## 2024-02-13 RX ORDER — PANTOPRAZOLE SODIUM 40 MG/1
40 TABLET, DELAYED RELEASE ORAL DAILY
Qty: 90 TABLET | Refills: 3 | Status: SHIPPED | OUTPATIENT
Start: 2024-02-13 | End: 2025-02-12

## 2024-02-13 RX ORDER — BUPROPION HYDROCHLORIDE 150 MG/1
150 TABLET ORAL EVERY MORNING
Qty: 90 TABLET | Refills: 3 | Status: SHIPPED | OUTPATIENT
Start: 2024-02-13

## 2024-02-13 RX ORDER — ALBUTEROL SULFATE 90 UG/1
2 AEROSOL, METERED RESPIRATORY (INHALATION) EVERY 6 HOURS PRN
Qty: 1 EACH | Refills: 3 | Status: SHIPPED | OUTPATIENT
Start: 2024-02-13

## 2024-02-13 RX ORDER — CITALOPRAM 40 MG/1
40 TABLET ORAL DAILY
Qty: 90 TABLET | Refills: 3 | Status: SHIPPED | OUTPATIENT
Start: 2024-02-13 | End: 2025-02-12

## 2024-02-13 NOTE — PROGRESS NOTES
Skin:     General: Skin is warm and dry.      Findings: No rash.   Neurological:      General: No focal deficit present.      Mental Status: She is alert. Mental status is at baseline.      Motor: No weakness.   Psychiatric:         Mood and Affect: Mood normal.         Behavior: Behavior normal.            Assessment / Plan:      1. Mixed hyperlipidemia  Lipid panel stable, will monitor.  - CBC with Auto Differential; Future  - Comprehensive Metabolic Panel; Future  - Lipid Panel; Future    2. Acute low back pain with sciatica, sciatica laterality unspecified, unspecified back pain laterality  Was not approved for GERA, but symptoms resolved with steroids and home exercises.  Continue following with pain management.     3. Hypothyroidism, unspecified type  Last TSH wnl.  Will monitor labs, continue current dose of synthroid.   - TSH; Future  - levothyroxine (SYNTHROID) 88 MCG tablet; Take 1 tablet by mouth daily  Dispense: 90 tablet; Refill: 3    4. Anemia, unspecified type  Chronic, stable.  Will continue to monitor.   - CBC with Auto Differential; Future    5. Depression with anxiety  Stable, well controlled.  Continue current medications.   - buPROPion (WELLBUTRIN XL) 150 MG extended release tablet; Take 1 tablet by mouth every morning  Dispense: 90 tablet; Refill: 3  - citalopram (CELEXA) 40 MG tablet; Take 1 tablet by mouth daily  Dispense: 90 tablet; Refill: 3    6. Paroxysmal atrial fibrillation (HCC)  In NSR, continue current regimen.  Following with cardiology.   - dilTIAZem (CARDIZEM CD) 120 MG extended release capsule; Take 1 capsule by mouth daily  Dispense: 90 capsule; Refill: 3  - rivaroxaban (XARELTO) 20 MG TABS tablet; Take 1 tablet by mouth daily  Dispense: 90 tablet; Refill: 3    7. Secondary hypercoagulable state (HCC)  - rivaroxaban (XARELTO) 20 MG TABS tablet; Take 1 tablet by mouth daily  Dispense: 90 tablet; Refill: 3    8. Mild intermittent asthma, unspecified whether complicated  Currently

## 2024-02-19 ENCOUNTER — SCHEDULED TELEPHONE ENCOUNTER (OUTPATIENT)
Dept: PULMONOLOGY | Age: 84
End: 2024-02-19
Payer: MEDICARE

## 2024-02-19 DIAGNOSIS — J45.21 MILD INTERMITTENT ASTHMA WITH ACUTE EXACERBATION: ICD-10-CM

## 2024-02-19 DIAGNOSIS — J45.909 ACUTE ASTHMATIC BRONCHITIS: Primary | ICD-10-CM

## 2024-02-19 DIAGNOSIS — R06.02 SOB (SHORTNESS OF BREATH): ICD-10-CM

## 2024-02-19 PROCEDURE — 99442 PR PHYS/QHP TELEPHONE EVALUATION 11-20 MIN: CPT | Performed by: INTERNAL MEDICINE

## 2024-02-19 RX ORDER — AZITHROMYCIN 250 MG/1
TABLET, FILM COATED ORAL
Qty: 1 PACKET | Refills: 0 | Status: SHIPPED | OUTPATIENT
Start: 2024-02-19

## 2024-02-19 RX ORDER — BENZONATATE 100 MG/1
100 CAPSULE ORAL 3 TIMES DAILY PRN
Qty: 30 CAPSULE | Refills: 1 | Status: SHIPPED | OUTPATIENT
Start: 2024-02-19 | End: 2024-03-10

## 2024-02-19 RX ORDER — PREDNISONE 5 MG/1
TABLET ORAL
Qty: 38 TABLET | Refills: 0 | Status: SHIPPED | OUTPATIENT
Start: 2024-02-19

## 2024-02-19 NOTE — PROGRESS NOTES
[facility] Pulmonary   Telephone Visit Note      Salome Blevins is a 83 y.o. female evaluated via telephone visit on 2/19/2024.      Documentation:  I communicated with the patient and/or health care decision maker about acute asthmatic bronchitis.   Details of this discussion including any medical advice provided: See below    Total Time: minutes: 11-20 minutes    Salome Blevins was evaluated through a synchronous (real-time) audio encounter. Patient identification was verified at the start of the visit. She (or guardian if applicable) is aware that this is a billable service, which includes applicable co-pays. This visit was conducted with the patient's (and/or legal guardian's) verbal consent. She has not had a related appointment within my department in the past 7 days or scheduled within the next 24 hours.   The patient was located at Home: [address] [place] [address].  The provider was located at Facility (Appt Dept): [address] [place] [address] [address]  [place] [address].    Note: not billable if this call serves to triage the patient into an appointment for the relevant concern    Salome Blevins is a 83 y.o. female evaluated via telephone on 2/19/2024 for Wheezing, Cough, and Congestion  .        Documentation:  Salome states that she does carry history of mild intermittent asthma.  She states that 5 weeks ago she developed a chest cold with cough, wheezing some chills and very mild sputum expectoration.  She test herself for COVID-19 infection and this was negative.  The symptoms persisted and she eventually went to the [facility] emergency room on [address] in [place], [place].  X-rays were obtained which demonstrated left lower lobe atelectasis.  She was tested for both influenza a and B which were negative and she had previously been tested for COVID-19 which was negative.  She was given doxycycline as an antibiotic and a Medrol Dosepak.  She finished that on

## 2024-02-22 ENCOUNTER — TELEPHONE (OUTPATIENT)
Dept: PULMONOLOGY | Age: 84
End: 2024-02-22

## 2024-02-22 ENCOUNTER — HOSPITAL ENCOUNTER (OUTPATIENT)
Dept: GENERAL RADIOLOGY | Age: 84
Discharge: HOME | End: 2024-02-22
Payer: MEDICARE

## 2024-02-22 ENCOUNTER — HOSPITAL ENCOUNTER (OUTPATIENT)
Age: 84
Discharge: HOME | End: 2024-02-22
Payer: MEDICARE

## 2024-02-22 DIAGNOSIS — J45.21 MILD INTERMITTENT ASTHMA WITH ACUTE EXACERBATION: ICD-10-CM

## 2024-02-22 DIAGNOSIS — R06.02 SOB (SHORTNESS OF BREATH): ICD-10-CM

## 2024-02-22 DIAGNOSIS — J45.909 ACUTE ASTHMATIC BRONCHITIS: ICD-10-CM

## 2024-02-22 DIAGNOSIS — J45.909 ACUTE ASTHMATIC BRONCHITIS: Primary | ICD-10-CM

## 2024-02-22 PROCEDURE — 71046 X-RAY EXAM CHEST 2 VIEWS: CPT

## 2024-02-22 NOTE — TELEPHONE ENCOUNTER
Patient called in to let you know that she is on the medications you prescribed for her. She states that she is still having a dry, unproductive cough and wheezing. She can be reached at [phone] if you need to talk to her.

## 2024-02-22 NOTE — TELEPHONE ENCOUNTER
I spoke to Mrs. Blevins today and she still is feeling poorly.  She has been on antibiotics and oral steroids for several days.  She still notes wheezing and cough.  She states that the Tessalon Perles have helped.  She mentions that she is not having fever or chills.  She has tested herself for COVID-19 but not recently.  I told her that I would continue the antibiotics and oral steroids.  I will obtain a chest x-ray and hopefully it can be compared to the chest x-ray done several weeks ago at [facility].  I also mentioned that it would be helpful to retest yourself for COVID-19 infection.  [facility] will continue to follow her

## 2024-03-20 DIAGNOSIS — F51.01 PRIMARY INSOMNIA: ICD-10-CM

## 2024-03-21 RX ORDER — ZOLPIDEM TARTRATE 5 MG/1
5 TABLET ORAL NIGHTLY PRN
Qty: 30 TABLET | Refills: 2 | Status: SHIPPED | OUTPATIENT
Start: 2024-03-21 | End: 2024-06-19

## 2024-03-21 NOTE — TELEPHONE ENCOUNTER
The script was Escripted from [facility]. They stated that they send an escript to us when pt picks up last refill. Pt picked up last refill yesterday

## 2024-04-15 DIAGNOSIS — F51.01 PRIMARY INSOMNIA: ICD-10-CM

## 2024-04-16 RX ORDER — ZOLPIDEM TARTRATE 5 MG/1
5 TABLET ORAL NIGHTLY PRN
Qty: 30 TABLET | Refills: 2 | Status: SHIPPED | OUTPATIENT
Start: 2024-04-16 | End: 2024-07-15

## 2024-06-11 ENCOUNTER — OFFICE VISIT (OUTPATIENT)
Dept: FAMILY MEDICINE CLINIC | Age: 84
End: 2024-06-11

## 2024-06-11 VITALS
BODY MASS INDEX: 31.54 KG/M2 | WEIGHT: 178 LBS | DIASTOLIC BLOOD PRESSURE: 78 MMHG | OXYGEN SATURATION: 97 % | SYSTOLIC BLOOD PRESSURE: 128 MMHG | RESPIRATION RATE: 16 BRPM | HEART RATE: 76 BPM | TEMPERATURE: 96.8 F

## 2024-06-11 DIAGNOSIS — H61.23 BILATERAL IMPACTED CERUMEN: Primary | ICD-10-CM

## 2024-06-11 ASSESSMENT — ENCOUNTER SYMPTOMS
ABDOMINAL PAIN: 0
RHINORRHEA: 0
VOMITING: 0
CHEST TIGHTNESS: 0
SHORTNESS OF BREATH: 0
WHEEZING: 0
DIARRHEA: 0
SINUS PRESSURE: 0
COUGH: 0
NAUSEA: 0
SORE THROAT: 0
SINUS PAIN: 0

## 2024-06-11 NOTE — PROGRESS NOTES
tablet by mouth nightly as needed for Sleep for up to 90 days. for sleep Max Daily Amount: 5 mg 30 tablet 2    buPROPion (WELLBUTRIN XL) 150 MG extended release tablet Take 1 tablet by mouth every morning 90 tablet 3    dilTIAZem (CARDIZEM CD) 120 MG extended release capsule Take 1 capsule by mouth daily 90 capsule 3    citalopram (CELEXA) 40 MG tablet Take 1 tablet by mouth daily 90 tablet 3    rivaroxaban (XARELTO) 20 MG TABS tablet Take 1 tablet by mouth daily 90 tablet 3    pantoprazole (PROTONIX) 40 MG tablet Take 1 tablet by mouth daily 90 tablet 3    albuterol sulfate HFA (PROVENTIL HFA) 108 (90 Base) MCG/ACT inhaler Inhale 2 puffs into the lungs every 6 hours as needed for Wheezing 1 each 3    levothyroxine (SYNTHROID) 88 MCG tablet Take 1 tablet by mouth daily 90 tablet 3    Magnesium Oxide 250 MG TABS Take 1 tablet by mouth daily 30 tablet 5    predniSONE (DELTASONE) 5 MG tablet Take 6 PO now, 5 PO qam for 2 days, 3 PO qam for 3 days, 2 PO qam for 4 days, 1 PO qam for 5 days, then STOP (Patient not taking: Reported on 6/11/2024) 38 tablet 0    azithromycin (ZITHROMAX) 250 MG tablet 2 PO now, then 1 PQ QAM for 5 days (Patient not taking: Reported on 6/11/2024) 1 packet 0    Handicap Placard MISC by Does not apply route Good for 5 years 1 each 0    Handicap Placard MISC by Does not apply route Expires 9/15/2028 1 each 0     No current facility-administered medications for this visit.        Past medical, family,surgical history reviewed today.     Objective:  /78   Pulse 76   Temp 96.8 °F (36 °C)   Resp 16   Wt 80.7 kg (178 lb)   SpO2 97%   BMI 31.54 kg/m²   BP Readings from Last 3 Encounters:   06/11/24 128/78   02/13/24 122/70   11/13/23 122/80     Wt Readings from Last 3 Encounters:   06/11/24 80.7 kg (178 lb)   02/13/24 79.4 kg (175 lb)   11/13/23 79.4 kg (175 lb)         Physical Exam  Constitutional:       Appearance: Normal appearance.   HENT:      Head: Normocephalic.      Right Ear:

## 2024-06-20 DIAGNOSIS — F51.01 PRIMARY INSOMNIA: ICD-10-CM

## 2024-06-20 RX ORDER — ZOLPIDEM TARTRATE 5 MG/1
5 TABLET ORAL NIGHTLY PRN
Qty: 30 TABLET | Refills: 2 | Status: SHIPPED | OUTPATIENT
Start: 2024-06-20 | End: 2024-09-18

## 2024-07-19 ENCOUNTER — PATIENT MESSAGE (OUTPATIENT)
Dept: INTERNAL MEDICINE CLINIC | Age: 84
End: 2024-07-19

## 2024-07-19 DIAGNOSIS — E03.9 HYPOTHYROIDISM, UNSPECIFIED TYPE: ICD-10-CM

## 2024-07-19 DIAGNOSIS — F51.01 PRIMARY INSOMNIA: ICD-10-CM

## 2024-07-19 RX ORDER — ZOLPIDEM TARTRATE 5 MG/1
5 TABLET ORAL NIGHTLY PRN
Qty: 30 TABLET | Refills: 2 | Status: SHIPPED | OUTPATIENT
Start: 2024-07-19 | End: 2024-10-17

## 2024-07-19 RX ORDER — LEVOTHYROXINE SODIUM 88 UG/1
88 TABLET ORAL DAILY
Qty: 90 TABLET | Refills: 3 | Status: SHIPPED | OUTPATIENT
Start: 2024-07-19

## 2024-07-19 NOTE — TELEPHONE ENCOUNTER
From: Salome Blevins  To: Dr. Prema Vera  Sent: 7/19/2024 12:41 PM EDT  Subject: Zolpidem and Synthroid    Please order refill for Zolpidem at [facility]. Thank you

## 2024-08-05 ENCOUNTER — OFFICE VISIT (OUTPATIENT)
Dept: FAMILY MEDICINE CLINIC | Age: 84
End: 2024-08-05
Payer: MEDICARE

## 2024-08-05 VITALS
DIASTOLIC BLOOD PRESSURE: 80 MMHG | SYSTOLIC BLOOD PRESSURE: 138 MMHG | OXYGEN SATURATION: 96 % | HEART RATE: 75 BPM | WEIGHT: 180 LBS | BODY MASS INDEX: 31.89 KG/M2 | RESPIRATION RATE: 16 BRPM | HEIGHT: 63 IN

## 2024-08-05 DIAGNOSIS — M25.531 RIGHT WRIST PAIN: ICD-10-CM

## 2024-08-05 DIAGNOSIS — S69.81XA INJURY OF TRIANGULAR FIBROCARTILAGE COMPLEX (TFCC) OF RIGHT WRIST, INITIAL ENCOUNTER: Primary | ICD-10-CM

## 2024-08-05 PROCEDURE — 1123F ACP DISCUSS/DSCN MKR DOCD: CPT | Performed by: NURSE PRACTITIONER

## 2024-08-05 SDOH — ECONOMIC STABILITY: FOOD INSECURITY: WITHIN THE PAST 12 MONTHS, YOU WORRIED THAT YOUR FOOD WOULD RUN OUT BEFORE YOU GOT MONEY TO BUY MORE.: PATIENT DECLINED

## 2024-08-05 SDOH — ECONOMIC STABILITY: HOUSING INSECURITY
IN THE LAST 12 MONTHS, WAS THERE A TIME WHEN YOU DID NOT HAVE A STEADY PLACE TO SLEEP OR SLEPT IN A SHELTER (INCLUDING NOW)?: PATIENT DECLINED

## 2024-08-05 SDOH — ECONOMIC STABILITY: INCOME INSECURITY: HOW HARD IS IT FOR YOU TO PAY FOR THE VERY BASICS LIKE FOOD, HOUSING, MEDICAL CARE, AND HEATING?: PATIENT DECLINED

## 2024-08-05 SDOH — ECONOMIC STABILITY: FOOD INSECURITY: WITHIN THE PAST 12 MONTHS, THE FOOD YOU BOUGHT JUST DIDN'T LAST AND YOU DIDN'T HAVE MONEY TO GET MORE.: PATIENT DECLINED

## 2024-08-05 ASSESSMENT — ENCOUNTER SYMPTOMS: RESPIRATORY NEGATIVE: 1

## 2024-08-06 ENCOUNTER — TELEPHONE (OUTPATIENT)
Dept: CARDIOLOGY CLINIC | Age: 84
End: 2024-08-06

## 2024-08-06 DIAGNOSIS — I48.0 PAROXYSMAL ATRIAL FIBRILLATION (HCC): ICD-10-CM

## 2024-08-06 DIAGNOSIS — D68.69 SECONDARY HYPERCOAGULABLE STATE (HCC): ICD-10-CM

## 2024-08-06 NOTE — TELEPHONE ENCOUNTER
Pt wants to talk to beth about a medication xarelto 20 mg she called her mail in and she is out before those come. Can she get some samples

## 2024-08-07 ENCOUNTER — HOSPITAL ENCOUNTER (OUTPATIENT)
Age: 84
Discharge: HOME | End: 2024-08-07
Payer: MEDICARE

## 2024-08-07 ENCOUNTER — HOSPITAL ENCOUNTER (OUTPATIENT)
Dept: GENERAL RADIOLOGY | Age: 84
Discharge: HOME | End: 2024-08-07
Payer: MEDICARE

## 2024-08-07 DIAGNOSIS — M25.531 RIGHT WRIST PAIN: ICD-10-CM

## 2024-08-07 PROCEDURE — 73110 X-RAY EXAM OF WRIST: CPT

## 2024-08-07 NOTE — PROGRESS NOTES
Pt returned to clinic today c/o worsening pain. Xray ordered. Referral to [facility] for pt as needed.   Will call with xray results  
other concerning symptoms arise go immediatly to the ER. Patient verbalized an understanding of all discharge instructions and was discharged in stable condition.     Return if symptoms worsen or fail to improve, for with PCP.       Electronically signed by MEY Castro CNP on 8/5/2024

## 2024-08-08 ENCOUNTER — HOSPITAL ENCOUNTER (OUTPATIENT)
Age: 84
Discharge: HOME | End: 2024-08-08
Payer: MEDICARE

## 2024-08-08 DIAGNOSIS — D64.9 ANEMIA, UNSPECIFIED TYPE: ICD-10-CM

## 2024-08-08 DIAGNOSIS — E78.2 MIXED HYPERLIPIDEMIA: ICD-10-CM

## 2024-08-08 DIAGNOSIS — E03.9 HYPOTHYROIDISM, UNSPECIFIED TYPE: ICD-10-CM

## 2024-08-08 LAB
ALBUMIN SERPL-MCNC: 3.9 GM/DL (ref 3.4–5)
ALP BLD-CCNC: 85 IU/L (ref 40–129)
ALT SERPL-CCNC: 13 U/L (ref 10–40)
ANION GAP SERPL CALCULATED.3IONS-SCNC: 12 MMOL/L (ref 7–16)
AST SERPL-CCNC: 14 IU/L (ref 15–37)
BASOPHILS ABSOLUTE: 0 K/CU MM
BASOPHILS RELATIVE PERCENT: 0.5 % (ref 0–1)
BILIRUB SERPL-MCNC: 0.3 MG/DL (ref 0–1)
BUN SERPL-MCNC: 18 MG/DL (ref 6–23)
CALCIUM SERPL-MCNC: 9 MG/DL (ref 8.3–10.6)
CHLORIDE BLD-SCNC: 103 MMOL/L (ref 99–110)
CHOLEST SERPL-MCNC: 169 MG/DL
CO2: 26 MMOL/L (ref 21–32)
CREAT SERPL-MCNC: 0.6 MG/DL (ref 0.6–1.1)
DIFFERENTIAL TYPE: ABNORMAL
EOSINOPHILS ABSOLUTE: 0.2 K/CU MM
EOSINOPHILS RELATIVE PERCENT: 3.2 % (ref 0–3)
GFR, ESTIMATED: 89 ML/MIN/1.73M2
GLUCOSE SERPL-MCNC: 81 MG/DL (ref 70–99)
HCT VFR BLD CALC: 34.9 % (ref 37–47)
HDLC SERPL-MCNC: 80 MG/DL
HEMOGLOBIN: 10.7 GM/DL (ref 12.5–16)
IMMATURE NEUTROPHIL %: 0.2 % (ref 0–0.43)
LDLC SERPL CALC-MCNC: 77 MG/DL
LYMPHOCYTES ABSOLUTE: 1.5 K/CU MM
LYMPHOCYTES RELATIVE PERCENT: 26.9 % (ref 24–44)
MCH RBC QN AUTO: 28.5 PG (ref 27–31)
MCHC RBC AUTO-ENTMCNC: 30.7 % (ref 32–36)
MCV RBC AUTO: 93.1 FL (ref 78–100)
MONOCYTES ABSOLUTE: 0.6 K/CU MM
MONOCYTES RELATIVE PERCENT: 11 % (ref 0–4)
NEUTROPHILS ABSOLUTE: 3.2 K/CU MM
NEUTROPHILS RELATIVE PERCENT: 58.2 % (ref 36–66)
NUCLEATED RBC %: 0 %
PDW BLD-RTO: 15.1 % (ref 11.7–14.9)
PLATELET # BLD: 203 K/CU MM (ref 140–440)
PMV BLD AUTO: 9.6 FL (ref 7.5–11.1)
POTASSIUM SERPL-SCNC: 4.7 MMOL/L (ref 3.5–5.1)
RBC # BLD: 3.75 M/CU MM (ref 4.2–5.4)
SODIUM BLD-SCNC: 141 MMOL/L (ref 135–145)
TOTAL IMMATURE NEUTOROPHIL: 0.01 K/CU MM
TOTAL NUCLEATED RBC: 0 K/CU MM
TOTAL PROTEIN: 6.2 GM/DL (ref 6.4–8.2)
TRIGL SERPL-MCNC: 62 MG/DL
TSH SERPL DL<=0.005 MIU/L-ACNC: 4.39 UIU/ML (ref 0.27–4.2)
WBC # BLD: 5.6 K/CU MM (ref 4–10.5)

## 2024-08-08 PROCEDURE — 85025 COMPLETE CBC W/AUTO DIFF WBC: CPT

## 2024-08-08 PROCEDURE — 84443 ASSAY THYROID STIM HORMONE: CPT

## 2024-08-08 PROCEDURE — 80061 LIPID PANEL: CPT

## 2024-08-08 PROCEDURE — 36415 COLL VENOUS BLD VENIPUNCTURE: CPT

## 2024-08-08 PROCEDURE — 80053 COMPREHEN METABOLIC PANEL: CPT

## 2024-08-13 ENCOUNTER — OFFICE VISIT (OUTPATIENT)
Dept: INTERNAL MEDICINE CLINIC | Age: 84
End: 2024-08-13

## 2024-08-13 VITALS
OXYGEN SATURATION: 97 % | DIASTOLIC BLOOD PRESSURE: 74 MMHG | HEART RATE: 81 BPM | SYSTOLIC BLOOD PRESSURE: 120 MMHG | WEIGHT: 177 LBS | BODY MASS INDEX: 31.45 KG/M2

## 2024-08-13 DIAGNOSIS — Z00.00 INITIAL MEDICARE ANNUAL WELLNESS VISIT: ICD-10-CM

## 2024-08-13 DIAGNOSIS — E66.9 OBESITY (BMI 30-39.9): Primary | ICD-10-CM

## 2024-08-13 DIAGNOSIS — E03.9 HYPOTHYROIDISM, UNSPECIFIED TYPE: ICD-10-CM

## 2024-08-13 RX ORDER — LEVOTHYROXINE SODIUM 0.1 MG/1
100 TABLET ORAL DAILY
Qty: 90 TABLET | Refills: 1 | Status: SHIPPED | OUTPATIENT
Start: 2024-08-13

## 2024-08-13 ASSESSMENT — PATIENT HEALTH QUESTIONNAIRE - PHQ9
SUM OF ALL RESPONSES TO PHQ QUESTIONS 1-9: 0
7. TROUBLE CONCENTRATING ON THINGS, SUCH AS READING THE NEWSPAPER OR WATCHING TELEVISION: NOT AT ALL
9. THOUGHTS THAT YOU WOULD BE BETTER OFF DEAD, OR OF HURTING YOURSELF: NOT AT ALL
10. IF YOU CHECKED OFF ANY PROBLEMS, HOW DIFFICULT HAVE THESE PROBLEMS MADE IT FOR YOU TO DO YOUR WORK, TAKE CARE OF THINGS AT HOME, OR GET ALONG WITH OTHER PEOPLE: NOT DIFFICULT AT ALL
SUM OF ALL RESPONSES TO PHQ QUESTIONS 1-9: 0
3. TROUBLE FALLING OR STAYING ASLEEP: NOT AT ALL
4. FEELING TIRED OR HAVING LITTLE ENERGY: NOT AT ALL
5. POOR APPETITE OR OVEREATING: NOT AT ALL
SUM OF ALL RESPONSES TO PHQ QUESTIONS 1-9: 0
6. FEELING BAD ABOUT YOURSELF - OR THAT YOU ARE A FAILURE OR HAVE LET YOURSELF OR YOUR FAMILY DOWN: NOT AT ALL
2. FEELING DOWN, DEPRESSED OR HOPELESS: NOT AT ALL
8. MOVING OR SPEAKING SO SLOWLY THAT OTHER PEOPLE COULD HAVE NOTICED. OR THE OPPOSITE, BEING SO FIGETY OR RESTLESS THAT YOU HAVE BEEN MOVING AROUND A LOT MORE THAN USUAL: NOT AT ALL
SUM OF ALL RESPONSES TO PHQ QUESTIONS 1-9: 0

## 2024-08-13 ASSESSMENT — LIFESTYLE VARIABLES: HOW MANY STANDARD DRINKS CONTAINING ALCOHOL DO YOU HAVE ON A TYPICAL DAY: PATIENT DOES NOT DRINK

## 2024-08-13 NOTE — PROGRESS NOTES
Medicare Annual Wellness Visit    Salome Blevins is here for Medicare AWV, 6 Month Follow-Up (Wants to talk about the wegovy-needing weight loss), Hyperlipidemia, and Hypothyroidism    Assessment & Plan   Obesity (BMI 30-39.9)  Have had extensive discussion in the past regarding weight loss, medication, risks/side effects, etc.  Patient never picked up medication previously and is requesting it to be reordered.  Will follow up in 4-6 weeks for weight check.   -     Tirzepatide (MOUNJARO) 2.5 MG/0.5ML SOPN SC injection; Inject 0.5 mLs into the skin once a week, Disp-4 each, R-1Normal  Initial Medicare annual wellness visit  Hypothyroidism, unspecified type  TSH slightly elevated, but given difficulties with weight loss, will adjust synthroid dose and recheck labs in 3 months.  -     levothyroxine (SYNTHROID) 100 MCG tablet; Take 1 tablet by mouth daily, Disp-90 tablet, R-1Normal        Recommendations for Preventive Services Due: see orders and patient instructions/AVS.  Recommended screening schedule for the next 5-10 years is provided to the patient in written form: see Patient Instructions/AVS.     Return in 6 weeks (on 9/24/2024).     Subjective   The following acute and/or chronic problems were also addressed today:    Patient was prescribed GLP1 last year, but states she never picked it up.  Has continued to try weight loss/lifestyle modifications on her own but has not been successful.  States it is continuing to affect her quality of life and wants to try medication.  Understands its risks, cost, etc.     Otherwise has been doing well.       Patient's complete Health Risk Assessment and screening values have been reviewed and are found in Flowsheets. The following problems were reviewed today and where indicated follow up appointments were made and/or referrals ordered.    Positive Risk Factor Screenings with Interventions:                  Abnormal BMI (obese):  Body mass index is 31.45 kg/m². (!)

## 2024-08-13 NOTE — PATIENT INSTRUCTIONS
or in one or both shoulders or arms.     Lightheadedness or sudden weakness.     A fast or irregular heartbeat.   After you call 911, the  may tell you to chew 1 adult-strength or 2 to 4 low-dose aspirin. Wait for an ambulance. Do not try to drive yourself.  Watch closely for changes in your health, and be sure to contact your doctor if you have any problems.  Where can you learn more?  Go to https://www.[employer].com.net/patientEd and enter F075 to learn more about \"A Healthy Heart: Care Instructions.\"  Current as of: June 24, 2023  Content Version: 14.1  © 9143-6122 [employer].   Care instructions adapted under license by [employer]. If you have questions about a medical condition or this instruction, always ask your healthcare professional. [employer] disclaims any warranty or liability for your use of this information.      Personalized Preventive Plan for Salome Blevins - 8/13/2024  Medicare offers a range of preventive health benefits. Some of the tests and screenings are paid in full while other may be subject to a deductible, co-insurance, and/or copay.    Some of these benefits include a comprehensive review of your medical history including lifestyle, illnesses that may run in your family, and various assessments and screenings as appropriate.    After reviewing your medical record and screening and assessments performed today your provider may have ordered immunizations, labs, imaging, and/or referrals for you.  A list of these orders (if applicable) as well as your Preventive Care list are included within your After Visit Summary for your review.    Other Preventive Recommendations:    A preventive eye exam performed by an eye specialist is recommended every 1-2 years to screen for glaucoma; cataracts, macular degeneration, and other eye disorders.  A preventive dental visit is recommended every 6 months.  Try to get at least 150 minutes of exercise per week or 10,000

## 2024-08-15 ENCOUNTER — PATIENT MESSAGE (OUTPATIENT)
Dept: INTERNAL MEDICINE CLINIC | Age: 84
End: 2024-08-15

## 2024-08-26 ENCOUNTER — PATIENT MESSAGE (OUTPATIENT)
Dept: INTERNAL MEDICINE CLINIC | Age: 84
End: 2024-08-26

## 2024-09-16 ENCOUNTER — OFFICE VISIT (OUTPATIENT)
Dept: CARDIOLOGY CLINIC | Age: 84
End: 2024-09-16
Payer: MEDICARE

## 2024-09-16 VITALS
BODY MASS INDEX: 28.7 KG/M2 | WEIGHT: 162 LBS | HEART RATE: 79 BPM | SYSTOLIC BLOOD PRESSURE: 110 MMHG | DIASTOLIC BLOOD PRESSURE: 66 MMHG | HEIGHT: 63 IN

## 2024-09-16 DIAGNOSIS — I48.0 PAROXYSMAL ATRIAL FIBRILLATION (HCC): Primary | ICD-10-CM

## 2024-09-16 PROCEDURE — 99214 OFFICE O/P EST MOD 30 MIN: CPT | Performed by: INTERNAL MEDICINE

## 2024-09-16 PROCEDURE — 93000 ELECTROCARDIOGRAM COMPLETE: CPT | Performed by: INTERNAL MEDICINE

## 2024-09-16 PROCEDURE — 1123F ACP DISCUSS/DSCN MKR DOCD: CPT | Performed by: INTERNAL MEDICINE

## 2024-09-23 ENCOUNTER — OFFICE VISIT (OUTPATIENT)
Dept: INTERNAL MEDICINE CLINIC | Age: 84
End: 2024-09-23

## 2024-09-23 VITALS
HEART RATE: 68 BPM | SYSTOLIC BLOOD PRESSURE: 118 MMHG | BODY MASS INDEX: 28.7 KG/M2 | DIASTOLIC BLOOD PRESSURE: 68 MMHG | OXYGEN SATURATION: 98 % | HEIGHT: 63 IN | WEIGHT: 162 LBS

## 2024-09-23 DIAGNOSIS — F51.01 PRIMARY INSOMNIA: ICD-10-CM

## 2024-09-23 DIAGNOSIS — E66.9 OBESITY (BMI 30-39.9): Primary | ICD-10-CM

## 2024-09-23 DIAGNOSIS — K21.9 GASTROESOPHAGEAL REFLUX DISEASE, UNSPECIFIED WHETHER ESOPHAGITIS PRESENT: ICD-10-CM

## 2024-09-23 DIAGNOSIS — E03.9 HYPOTHYROIDISM, UNSPECIFIED TYPE: ICD-10-CM

## 2024-09-23 RX ORDER — ZOLPIDEM TARTRATE 5 MG/1
5 TABLET ORAL NIGHTLY PRN
Qty: 30 TABLET | Refills: 2 | Status: SHIPPED | OUTPATIENT
Start: 2024-09-23 | End: 2024-12-22

## 2024-09-23 RX ORDER — PANTOPRAZOLE SODIUM 40 MG/1
40 TABLET, DELAYED RELEASE ORAL DAILY
Qty: 90 TABLET | Refills: 3 | Status: SHIPPED | OUTPATIENT
Start: 2024-09-23 | End: 2025-09-23

## 2024-09-23 ASSESSMENT — ENCOUNTER SYMPTOMS
DIARRHEA: 0
SORE THROAT: 0
ABDOMINAL PAIN: 0
SHORTNESS OF BREATH: 0
VOMITING: 0
COLOR CHANGE: 0
CHEST TIGHTNESS: 0
CONSTIPATION: 0

## 2024-10-21 DIAGNOSIS — F51.01 PRIMARY INSOMNIA: ICD-10-CM

## 2024-10-21 DIAGNOSIS — E66.9 OBESITY (BMI 30-39.9): ICD-10-CM

## 2024-10-21 RX ORDER — ZOLPIDEM TARTRATE 5 MG/1
5 TABLET ORAL NIGHTLY PRN
Qty: 30 TABLET | Refills: 2 | Status: SHIPPED | OUTPATIENT
Start: 2024-10-21 | End: 2025-01-19

## 2024-12-09 ENCOUNTER — OFFICE VISIT (OUTPATIENT)
Dept: INTERNAL MEDICINE CLINIC | Age: 84
End: 2024-12-09

## 2024-12-09 VITALS
WEIGHT: 154 LBS | SYSTOLIC BLOOD PRESSURE: 110 MMHG | DIASTOLIC BLOOD PRESSURE: 70 MMHG | OXYGEN SATURATION: 97 % | HEIGHT: 63 IN | BODY MASS INDEX: 27.29 KG/M2 | HEART RATE: 77 BPM

## 2024-12-09 DIAGNOSIS — E03.9 HYPOTHYROIDISM, UNSPECIFIED TYPE: ICD-10-CM

## 2024-12-09 DIAGNOSIS — E55.9 VITAMIN D DEFICIENCY: ICD-10-CM

## 2024-12-09 DIAGNOSIS — E78.2 MIXED HYPERLIPIDEMIA: Primary | ICD-10-CM

## 2024-12-09 DIAGNOSIS — F41.8 DEPRESSION WITH ANXIETY: ICD-10-CM

## 2024-12-09 NOTE — PROGRESS NOTES
deficiency  - Vitamin D 25 Hydroxy; Future    4. Depression with anxiety  Stable, well controlled.  Continue current medications.           I have spent 22 minutes on this patient encounter.     Patient voiced understanding and agreement with plan.  All questions/concerns were addressed, risks/side effects of medications were reviewed.  Return precautions and after visit summary were provided.  Return in about 6 months (around 6/9/2025). or earlier as needed.      Prema Vera MD

## 2024-12-10 ASSESSMENT — ENCOUNTER SYMPTOMS
ABDOMINAL PAIN: 0
SORE THROAT: 0
CHEST TIGHTNESS: 0
VOMITING: 0
SHORTNESS OF BREATH: 0
DIARRHEA: 0
CONSTIPATION: 0
COLOR CHANGE: 0

## 2024-12-20 DIAGNOSIS — F51.01 PRIMARY INSOMNIA: ICD-10-CM

## 2024-12-23 RX ORDER — ZOLPIDEM TARTRATE 5 MG/1
5 TABLET ORAL NIGHTLY PRN
Qty: 30 TABLET | Refills: 2 | Status: SHIPPED | OUTPATIENT
Start: 2024-12-23 | End: 2025-03-23

## 2025-01-10 DIAGNOSIS — K21.9 GASTROESOPHAGEAL REFLUX DISEASE, UNSPECIFIED WHETHER ESOPHAGITIS PRESENT: ICD-10-CM

## 2025-01-10 RX ORDER — PANTOPRAZOLE SODIUM 40 MG/1
40 TABLET, DELAYED RELEASE ORAL DAILY
Qty: 90 TABLET | Refills: 1 | Status: SHIPPED | OUTPATIENT
Start: 2025-01-10 | End: 2025-01-10 | Stop reason: SDUPTHER

## 2025-01-10 RX ORDER — PANTOPRAZOLE SODIUM 40 MG/1
40 TABLET, DELAYED RELEASE ORAL DAILY
Qty: 90 TABLET | Refills: 1 | Status: SHIPPED | OUTPATIENT
Start: 2025-01-10 | End: 2026-01-10

## 2025-01-10 RX ORDER — PANTOPRAZOLE SODIUM 40 MG/1
40 TABLET, DELAYED RELEASE ORAL DAILY
Qty: 30 TABLET | Refills: 0 | Status: SHIPPED | OUTPATIENT
Start: 2025-01-10 | End: 2025-01-10 | Stop reason: SDUPTHER

## 2025-01-10 NOTE — TELEPHONE ENCOUNTER
Patient called needing a partial refill sent to [facility] The rest sent to [facility] for this medication. Patient is all out.

## 2025-01-25 DIAGNOSIS — F41.8 DEPRESSION WITH ANXIETY: ICD-10-CM

## 2025-01-27 RX ORDER — BUPROPION HYDROCHLORIDE 150 MG/1
150 TABLET ORAL EVERY MORNING
Qty: 90 TABLET | Refills: 1 | Status: SHIPPED | OUTPATIENT
Start: 2025-01-27

## 2025-03-19 DIAGNOSIS — F51.01 PRIMARY INSOMNIA: ICD-10-CM

## 2025-03-20 RX ORDER — ZOLPIDEM TARTRATE 5 MG/1
TABLET ORAL
Qty: 30 TABLET | Refills: 2 | Status: SHIPPED | OUTPATIENT
Start: 2025-03-20 | End: 2025-06-18

## 2025-03-25 DIAGNOSIS — I48.0 PAROXYSMAL ATRIAL FIBRILLATION (HCC): ICD-10-CM

## 2025-03-25 DIAGNOSIS — F51.01 PRIMARY INSOMNIA: ICD-10-CM

## 2025-03-25 DIAGNOSIS — K21.9 GASTROESOPHAGEAL REFLUX DISEASE, UNSPECIFIED WHETHER ESOPHAGITIS PRESENT: ICD-10-CM

## 2025-03-25 DIAGNOSIS — F41.8 DEPRESSION WITH ANXIETY: ICD-10-CM

## 2025-03-25 DIAGNOSIS — D68.69 SECONDARY HYPERCOAGULABLE STATE: ICD-10-CM

## 2025-03-25 DIAGNOSIS — E03.9 HYPOTHYROIDISM, UNSPECIFIED TYPE: ICD-10-CM

## 2025-03-26 RX ORDER — DILTIAZEM HYDROCHLORIDE 120 MG/1
120 CAPSULE, COATED, EXTENDED RELEASE ORAL DAILY
Qty: 90 CAPSULE | Refills: 3 | Status: SHIPPED | OUTPATIENT
Start: 2025-03-26

## 2025-03-26 RX ORDER — LEVOTHYROXINE SODIUM 100 UG/1
100 TABLET ORAL DAILY
Qty: 90 TABLET | Refills: 3 | Status: SHIPPED | OUTPATIENT
Start: 2025-03-26

## 2025-03-26 RX ORDER — CITALOPRAM HYDROBROMIDE 40 MG/1
40 TABLET ORAL DAILY
Qty: 90 TABLET | Refills: 3 | Status: SHIPPED | OUTPATIENT
Start: 2025-03-26 | End: 2026-03-26

## 2025-03-26 RX ORDER — ZOLPIDEM TARTRATE 5 MG/1
TABLET ORAL
Qty: 30 TABLET | Refills: 2 | OUTPATIENT
Start: 2025-03-26 | End: 2025-06-24

## 2025-03-26 RX ORDER — PANTOPRAZOLE SODIUM 40 MG/1
40 TABLET, DELAYED RELEASE ORAL DAILY
Qty: 90 TABLET | Refills: 3 | Status: SHIPPED | OUTPATIENT
Start: 2025-03-26 | End: 2026-03-26

## 2025-06-09 ENCOUNTER — OFFICE VISIT (OUTPATIENT)
Dept: INTERNAL MEDICINE CLINIC | Age: 85
End: 2025-06-09
Payer: MEDICARE

## 2025-06-09 VITALS
OXYGEN SATURATION: 97 % | BODY MASS INDEX: 25.52 KG/M2 | WEIGHT: 144 LBS | SYSTOLIC BLOOD PRESSURE: 104 MMHG | HEIGHT: 63 IN | DIASTOLIC BLOOD PRESSURE: 74 MMHG | HEART RATE: 80 BPM

## 2025-06-09 DIAGNOSIS — E78.2 MIXED HYPERLIPIDEMIA: ICD-10-CM

## 2025-06-09 DIAGNOSIS — E03.9 HYPOTHYROIDISM, UNSPECIFIED TYPE: ICD-10-CM

## 2025-06-09 DIAGNOSIS — F51.01 PRIMARY INSOMNIA: Primary | ICD-10-CM

## 2025-06-09 DIAGNOSIS — I48.0 PAROXYSMAL ATRIAL FIBRILLATION (HCC): ICD-10-CM

## 2025-06-09 DIAGNOSIS — E55.9 VITAMIN D DEFICIENCY: ICD-10-CM

## 2025-06-09 DIAGNOSIS — F41.8 DEPRESSION WITH ANXIETY: ICD-10-CM

## 2025-06-09 PROCEDURE — 1159F MED LIST DOCD IN RCRD: CPT | Performed by: FAMILY MEDICINE

## 2025-06-09 PROCEDURE — G2211 COMPLEX E/M VISIT ADD ON: HCPCS | Performed by: FAMILY MEDICINE

## 2025-06-09 PROCEDURE — 99214 OFFICE O/P EST MOD 30 MIN: CPT | Performed by: FAMILY MEDICINE

## 2025-06-09 PROCEDURE — 1123F ACP DISCUSS/DSCN MKR DOCD: CPT | Performed by: FAMILY MEDICINE

## 2025-06-09 RX ORDER — ZOLPIDEM TARTRATE 5 MG/1
5 TABLET ORAL NIGHTLY PRN
Qty: 30 TABLET | Refills: 2 | Status: SHIPPED | OUTPATIENT
Start: 2025-06-09 | End: 2025-09-07

## 2025-06-09 SDOH — ECONOMIC STABILITY: FOOD INSECURITY: WITHIN THE PAST 12 MONTHS, YOU WORRIED THAT YOUR FOOD WOULD RUN OUT BEFORE YOU GOT MONEY TO BUY MORE.: NEVER TRUE

## 2025-06-09 SDOH — ECONOMIC STABILITY: FOOD INSECURITY: WITHIN THE PAST 12 MONTHS, THE FOOD YOU BOUGHT JUST DIDN'T LAST AND YOU DIDN'T HAVE MONEY TO GET MORE.: NEVER TRUE

## 2025-06-09 ASSESSMENT — ENCOUNTER SYMPTOMS
ABDOMINAL PAIN: 0
DIARRHEA: 0
SORE THROAT: 0
SHORTNESS OF BREATH: 0
CHEST TIGHTNESS: 0
CONSTIPATION: 0
COLOR CHANGE: 0
VOMITING: 0

## 2025-06-09 ASSESSMENT — PATIENT HEALTH QUESTIONNAIRE - PHQ9
10. IF YOU CHECKED OFF ANY PROBLEMS, HOW DIFFICULT HAVE THESE PROBLEMS MADE IT FOR YOU TO DO YOUR WORK, TAKE CARE OF THINGS AT HOME, OR GET ALONG WITH OTHER PEOPLE: NOT DIFFICULT AT ALL
SUM OF ALL RESPONSES TO PHQ QUESTIONS 1-9: 0
1. LITTLE INTEREST OR PLEASURE IN DOING THINGS: NOT AT ALL
2. FEELING DOWN, DEPRESSED OR HOPELESS: NOT AT ALL
4. FEELING TIRED OR HAVING LITTLE ENERGY: NOT AT ALL
3. TROUBLE FALLING OR STAYING ASLEEP: NOT AT ALL
9. THOUGHTS THAT YOU WOULD BE BETTER OFF DEAD, OR OF HURTING YOURSELF: NOT AT ALL
7. TROUBLE CONCENTRATING ON THINGS, SUCH AS READING THE NEWSPAPER OR WATCHING TELEVISION: NOT AT ALL
SUM OF ALL RESPONSES TO PHQ QUESTIONS 1-9: 0
5. POOR APPETITE OR OVEREATING: NOT AT ALL
SUM OF ALL RESPONSES TO PHQ QUESTIONS 1-9: 0
8. MOVING OR SPEAKING SO SLOWLY THAT OTHER PEOPLE COULD HAVE NOTICED. OR THE OPPOSITE, BEING SO FIGETY OR RESTLESS THAT YOU HAVE BEEN MOVING AROUND A LOT MORE THAN USUAL: NOT AT ALL
6. FEELING BAD ABOUT YOURSELF - OR THAT YOU ARE A FAILURE OR HAVE LET YOURSELF OR YOUR FAMILY DOWN: NOT AT ALL
SUM OF ALL RESPONSES TO PHQ QUESTIONS 1-9: 0

## 2025-06-09 NOTE — PROGRESS NOTES
Subjective:      Chief Complaint   Patient presents with    6 Month Follow-Up     No concerns       HPI:  Salome Blevins is a 84 y.o. female who presents today for follow up of chronic conditions as listed below.    States she has gained some weight back since being on ozempic, but has kept most of it off.  Knows what she needs to do to lose it again.  States she feels significantly better since losing the weight.        Is no longer taking wellbutrin but mood has still been doing well on celexa.      Denying thyroid symptoms on new dose of synthroid, but did not get repeat labs completed.     Feeling well today, no acute concerns.         Past Medical History:   Diagnosis Date    Abnormal EKG     Acute asthmatic bronchitis 2024    Adenomatous colon polyp 2014    Anxiety     Asthma     Atrial fibrillation with RVR (HCC) 2018    Chronic anticoagulation 2018    A Fib; Xarelto    Chronic radicular lumbar pain 2013    Left; Jacob; Neville; Sujatha    Depression with anxiety 2004    Eczema     History of echocardiogram 2020    EF 55-60%, Mild left ventricular hypertrophy, mildly dilated left atrium, aneurysmal interatrial septum, Mild MR, [place], [place], [place], No pericardial effusion    History of exercise stress test 2020    Treadmill, normal    History of Holter monitoring 2019    Blocked APCS    Hypothyroidism 2012    Insomnia     LBBB (left bundle branch block)     Osteoarthritis of thumb     base of thumbs bilateral    S/P ablation of atrial fibrillation 2018    S/P knee replacement     bilateral    SOB (shortness of breath)         Past Surgical History:   Procedure Laterality Date    ABLATION OF DYSRHYTHMIC FOCUS  2018    BREAST BIOPSY Bilateral     CATARACT REMOVAL WITH IMPLANT Bilateral      SECTION      X 3    TOTAL KNEE ARTHROPLASTY Right 2017    Ramon    TOTAL KNEE ARTHROPLASTY Left 2018    Dr Navarro       Social History     [employer]

## 2025-06-10 ENCOUNTER — HOSPITAL ENCOUNTER (OUTPATIENT)
Age: 85
Discharge: HOME | End: 2025-06-10
Payer: MEDICARE

## 2025-06-10 DIAGNOSIS — E03.9 HYPOTHYROIDISM, UNSPECIFIED TYPE: ICD-10-CM

## 2025-06-10 DIAGNOSIS — E78.2 MIXED HYPERLIPIDEMIA: ICD-10-CM

## 2025-06-10 DIAGNOSIS — E55.9 VITAMIN D DEFICIENCY: ICD-10-CM

## 2025-06-10 LAB
25(OH)D3 SERPL-MCNC: 37 NG/ML (ref 30–150)
ALBUMIN SERPL-MCNC: 3.9 G/DL (ref 3.4–5)
ALBUMIN/GLOB SERPL: 1.5 {RATIO} (ref 1.1–2.2)
ALP SERPL-CCNC: 75 U/L (ref 40–129)
ALT SERPL-CCNC: 10 U/L (ref 10–40)
ANION GAP SERPL CALCULATED.3IONS-SCNC: 10 MMOL/L (ref 9–17)
AST SERPL-CCNC: 17 U/L (ref 15–37)
BASOPHILS # BLD: 0.03 K/UL
BASOPHILS NFR BLD: 1 % (ref 0–1)
BILIRUB SERPL-MCNC: 0.3 MG/DL (ref 0–1)
BUN SERPL-MCNC: 18 MG/DL (ref 7–20)
CALCIUM SERPL-MCNC: 9.1 MG/DL (ref 8.3–10.6)
CHLORIDE SERPL-SCNC: 102 MMOL/L (ref 99–110)
CHOLEST SERPL-MCNC: 164 MG/DL (ref 125–199)
CO2 SERPL-SCNC: 25 MMOL/L (ref 21–32)
CREAT SERPL-MCNC: 0.6 MG/DL (ref 0.6–1.2)
EOSINOPHIL # BLD: 0.17 K/UL
EOSINOPHILS RELATIVE PERCENT: 4 % (ref 0–3)
ERYTHROCYTE [DISTWIDTH] IN BLOOD BY AUTOMATED COUNT: 13.4 % (ref 11.7–14.9)
GFR, ESTIMATED: >90 ML/MIN/1.73M2
GLUCOSE SERPL-MCNC: 85 MG/DL (ref 74–99)
HCT VFR BLD AUTO: 39.2 % (ref 37–47)
HDLC SERPL-MCNC: 74 MG/DL
HGB BLD-MCNC: 12 G/DL (ref 12.5–16)
IMM GRANULOCYTES # BLD AUTO: 0.02 K/UL
IMM GRANULOCYTES NFR BLD: 0 %
LDLC SERPL CALC-MCNC: 81 MG/DL
LYMPHOCYTES NFR BLD: 1.77 K/UL
LYMPHOCYTES RELATIVE PERCENT: 39 % (ref 24–44)
MCH RBC QN AUTO: 30.7 PG (ref 27–31)
MCHC RBC AUTO-ENTMCNC: 30.6 G/DL (ref 32–36)
MCV RBC AUTO: 100.3 FL (ref 78–100)
MONOCYTES NFR BLD: 0.38 K/UL
MONOCYTES NFR BLD: 8 % (ref 0–5)
NEUTROPHILS NFR BLD: 47 % (ref 36–66)
NEUTS SEG NFR BLD: 2.13 K/UL
PLATELET # BLD AUTO: 148 K/UL (ref 140–440)
PMV BLD AUTO: 10.4 FL (ref 7.5–11.1)
POTASSIUM SERPL-SCNC: 4.4 MMOL/L (ref 3.5–5.1)
PROT SERPL-MCNC: 6.4 G/DL (ref 6.4–8.2)
RBC # BLD AUTO: 3.91 M/UL (ref 4.2–5.4)
SODIUM SERPL-SCNC: 137 MMOL/L (ref 136–145)
TRIGL SERPL-MCNC: 45 MG/DL
TSH SERPL DL<=0.05 MIU/L-ACNC: 1.06 UIU/ML (ref 0.27–4.2)
WBC OTHER # BLD: 4.5 K/UL (ref 4–10.5)

## 2025-06-10 PROCEDURE — 85025 COMPLETE CBC W/AUTO DIFF WBC: CPT

## 2025-06-10 PROCEDURE — 84443 ASSAY THYROID STIM HORMONE: CPT

## 2025-06-10 PROCEDURE — 82306 VITAMIN D 25 HYDROXY: CPT

## 2025-06-10 PROCEDURE — 80053 COMPREHEN METABOLIC PANEL: CPT

## 2025-06-10 PROCEDURE — 80061 LIPID PANEL: CPT

## 2025-08-05 ENCOUNTER — TELEPHONE (OUTPATIENT)
Dept: CARDIOLOGY CLINIC | Age: 85
End: 2025-08-05

## 2025-08-05 DIAGNOSIS — I48.0 PAROXYSMAL ATRIAL FIBRILLATION (HCC): Primary | ICD-10-CM

## 2025-08-06 ENCOUNTER — OFFICE VISIT (OUTPATIENT)
Dept: CARDIOLOGY CLINIC | Age: 85
End: 2025-08-06
Payer: MEDICARE

## 2025-08-06 VITALS
DIASTOLIC BLOOD PRESSURE: 64 MMHG | BODY MASS INDEX: 26.79 KG/M2 | WEIGHT: 145.6 LBS | HEIGHT: 62 IN | SYSTOLIC BLOOD PRESSURE: 120 MMHG | HEART RATE: 71 BPM

## 2025-08-06 DIAGNOSIS — D68.69 SECONDARY HYPERCOAGULABLE STATE: ICD-10-CM

## 2025-08-06 DIAGNOSIS — I48.0 PAROXYSMAL ATRIAL FIBRILLATION (HCC): Primary | ICD-10-CM

## 2025-08-06 PROCEDURE — 1123F ACP DISCUSS/DSCN MKR DOCD: CPT | Performed by: NURSE PRACTITIONER

## 2025-08-06 PROCEDURE — 1159F MED LIST DOCD IN RCRD: CPT | Performed by: NURSE PRACTITIONER

## 2025-08-06 PROCEDURE — 99214 OFFICE O/P EST MOD 30 MIN: CPT | Performed by: NURSE PRACTITIONER

## 2025-08-06 PROCEDURE — 1160F RVW MEDS BY RX/DR IN RCRD: CPT | Performed by: NURSE PRACTITIONER

## 2025-08-06 PROCEDURE — 93000 ELECTROCARDIOGRAM COMPLETE: CPT | Performed by: NURSE PRACTITIONER

## 2025-08-06 ASSESSMENT — ENCOUNTER SYMPTOMS
ORTHOPNEA: 0
SHORTNESS OF BREATH: 0